# Patient Record
Sex: FEMALE | Race: WHITE | NOT HISPANIC OR LATINO | Employment: FULL TIME | URBAN - METROPOLITAN AREA
[De-identification: names, ages, dates, MRNs, and addresses within clinical notes are randomized per-mention and may not be internally consistent; named-entity substitution may affect disease eponyms.]

---

## 2017-01-04 ENCOUNTER — OFFICE VISIT (OUTPATIENT)
Dept: RETAIL CLINIC | Facility: CLINIC | Age: 57
End: 2017-01-04

## 2017-01-04 VITALS
TEMPERATURE: 97.7 F | HEART RATE: 74 BPM | OXYGEN SATURATION: 97 % | SYSTOLIC BLOOD PRESSURE: 108 MMHG | DIASTOLIC BLOOD PRESSURE: 74 MMHG | RESPIRATION RATE: 18 BRPM

## 2017-01-04 DIAGNOSIS — R31.9 URINARY TRACT INFECTION WITH HEMATURIA, SITE UNSPECIFIED: Primary | ICD-10-CM

## 2017-01-04 DIAGNOSIS — N39.0 URINARY TRACT INFECTION WITH HEMATURIA, SITE UNSPECIFIED: Primary | ICD-10-CM

## 2017-01-04 LAB
BILIRUB BLD-MCNC: NEGATIVE MG/DL
CLARITY, POC: CLEAR
COLOR UR: ABNORMAL
GLUCOSE UR STRIP-MCNC: NEGATIVE MG/DL
KETONES UR QL: NEGATIVE
LEUKOCYTE EST, POC: ABNORMAL
NITRITE UR-MCNC: POSITIVE MG/ML
PH UR: 7 [PH] (ref 5–8)
PROT UR STRIP-MCNC: ABNORMAL MG/DL
RBC # UR STRIP: ABNORMAL /UL
SP GR UR: 1.02 (ref 1–1.03)
UROBILINOGEN UR QL: NORMAL

## 2017-01-04 PROCEDURE — 99213 OFFICE O/P EST LOW 20 MIN: CPT | Performed by: NURSE PRACTITIONER

## 2017-01-04 PROCEDURE — 81003 URINALYSIS AUTO W/O SCOPE: CPT | Performed by: NURSE PRACTITIONER

## 2017-01-04 RX ORDER — NITROFURANTOIN 25; 75 MG/1; MG/1
100 CAPSULE ORAL 2 TIMES DAILY
Qty: 14 CAPSULE | Refills: 0 | Status: SHIPPED | OUTPATIENT
Start: 2017-01-04 | End: 2017-01-11

## 2017-01-04 NOTE — PROGRESS NOTES
Subjective:     Lena Kirkpatrick is a 56 y.o.     Urinary Tract Infection    This is a new problem. The current episode started today. Pain severity now: mild burning. There has been no fever. Associated symptoms include flank pain and hematuria. Pertinent negatives include no frequency, hesitancy, nausea, urgency or vomiting. Her past medical history is significant for recurrent UTIs. There is no history of kidney stones.         The following portions of the patient's history were reviewed and updated as appropriate: allergies, current medications, past family history, past medical history, past social history, past surgical history and problem list.      Review of Systems   Constitutional: Negative for appetite change and fever.   Respiratory: Negative for shortness of breath, wheezing and stridor.    Cardiovascular: Chest pain: hypertension.   Gastrointestinal: Negative for diarrhea, nausea and vomiting.   Genitourinary: Positive for flank pain and hematuria. Negative for difficulty urinating, frequency, hesitancy, urgency, vaginal bleeding and vaginal discharge.   Skin: Negative for color change, pallor and rash.   Neurological: Negative for dizziness, light-headedness and headaches.         Objective:      Physical Exam   Constitutional: She is oriented to person, place, and time. She appears well-developed and well-nourished. She is cooperative.   HENT:   Head: Normocephalic and atraumatic.   Right Ear: External ear and ear canal normal.   Left Ear: External ear and ear canal normal.   Nose: Nose normal.   Eyes: Conjunctivae and lids are normal.   Cardiovascular: Normal rate, regular rhythm, S1 normal, S2 normal and normal heart sounds.    Pulmonary/Chest: Effort normal and breath sounds normal.   Abdominal: Soft. Normal appearance and bowel sounds are normal. She exhibits no mass. There is no tenderness. There is no rigidity, no guarding and no CVA tenderness.   Lymphadenopathy:     She has no cervical  adenopathy.   Neurological: She is alert and oriented to person, place, and time.   Skin: Skin is warm, dry and intact.   Psychiatric: She has a normal mood and affect. Her speech is normal.   Vitals reviewed.          Lena was seen today for urinary tract infection.    Diagnoses and all orders for this visit:    Urinary tract infection with hematuria, site unspecified  -     Urine Culture  -     POC Urinalysis Dipstick, Automated    Other orders  -     nitrofurantoin, macrocrystal-monohydrate, (MACROBID) 100 MG capsule; Take 1 capsule by mouth 2 (Two) Times a Day for 7 days.

## 2017-01-04 NOTE — MR AVS SNAPSHOT
Lena Kirkpatrick   1/4/2017 8:45 AM   Office Visit    Dept Phone:  [phone]   Encounter #:  [id number]    Provider:  PROVIDER JESI LEWIS   Department:  [facility]                Your Full Care Plan              Today's Medication Changes          These changes are accurate as of: 1/4/17 10:00 AM.  If you have any questions, ask your nurse or doctor.               New Medication(s)Ordered:     nitrofurantoin (macrocrystal-monohydrate) 100 MG capsule   Commonly known as:  MACROBID   Take 1 capsule by mouth 2 (Two) Times a Day for 7 days.            Where to Get Your Medications      These medications were sent to [address] [address] [facility] - [phone] [facility] [phone]   [address] [address] [address]     Phone:  [phone]     nitrofurantoin (macrocrystal-monohydrate) 100 MG capsule                  Your Updated Medication List          This list is accurate as of: 1/4/17 10:00 AM.  Always use your most recent med list.                escitalopram 20 MG tablet   Commonly known as:  LEXAPRO       FLUARIX QUADRIVALENT 0.5 ML suspension prefilled syringe injection   Generic drug:  influenza vac split quad       lisinopril 20 MG tablet   Commonly known as:  PRINIVIL,ZESTRIL       nitrofurantoin (macrocrystal-monohydrate) 100 MG capsule   Commonly known as:  MACROBID   Take 1 capsule by mouth 2 (Two) Times a Day for 7 days.       PNEUMOVAX 23 25 MCG/0.5ML vaccine   Generic drug:  pneumococcal polysaccharide 23-valent       PREMARIN 0.625 MG/GM vaginal cream   Generic drug:  conjugated estrogens       PROVENTIL  (90 BASE) MCG/ACT inhaler   Generic drug:  albuterol               Instructions    Urinary Tract Infection  Urinary tract infections (UTIs) can develop anywhere along your urinary tract. Your urinary tract is your body's drainage system for removing wastes and extra water. Your urinary tract  includes two kidneys, two ureters, a bladder, and a urethra. Your kidneys are a pair of bean-shaped organs. Each kidney is about the size of your fist. They are located below your ribs, one on each side of your spine.  CAUSES  Infections are caused by microbes, which are microscopic organisms, including fungi, viruses, and bacteria. These organisms are so small that they can only be seen through a microscope. Bacteria are the microbes that most commonly cause UTIs.  SYMPTOMS   Symptoms of UTIs may vary by age and gender of the patient and by the location of the infection. Symptoms in young women typically include a frequent and intense urge to urinate and a painful, burning feeling in the bladder or urethra during urination. Older women and men are more likely to be tired, shaky, and weak and have muscle aches and abdominal pain. A fever may mean the infection is in your kidneys. Other symptoms of a kidney infection include pain in your back or sides below the ribs, nausea, and vomiting.  DIAGNOSIS  To diagnose a UTI, your caregiver will ask you about your symptoms. Your caregiver will also ask you to provide a urine sample. The urine sample will be tested for bacteria and white blood cells. White blood cells are made by your body to help fight infection.  TREATMENT   Typically, UTIs can be treated with medication. Because most UTIs are caused by a bacterial infection, they usually can be treated with the use of antibiotics. The choice of antibiotic and length of treatment depend on your symptoms and the type of bacteria causing your infection.  HOME CARE INSTRUCTIONS  · If you were prescribed antibiotics, take them exactly as your caregiver instructs you. Finish the medication even if you feel better after you have only taken some of the medication.  · Drink enough water and fluids to keep your urine clear or pale yellow.  · Avoid caffeine, tea, and carbonated beverages. They tend to irritate your bladder.  · Empty  your bladder often. Avoid holding urine for long periods of time.  · Empty your bladder before and after sexual intercourse.  · After a bowel movement, women should cleanse from front to back. Use each tissue only once.  SEEK MEDICAL CARE IF:   · You have back pain.  · You develop a fever.  · Your symptoms do not begin to resolve within 3 days.  SEEK IMMEDIATE MEDICAL CARE IF:   · You have severe back pain or lower abdominal pain.  · You develop chills.  · You have nausea or vomiting.  · You have continued burning or discomfort with urination.  MAKE SURE YOU:   · Understand these instructions.  · Will watch your condition.  · Will get help right away if you are not doing well or get worse.     This information is not intended to replace advice given to you by your health care provider. Make sure you discuss any questions you have with your health care provider.     Document Released: 2006 Document Revised: 2016 Document Reviewed: 2013  [employer] Interactive Patient Education © [employer]       Patient Instructions History      Upcoming Appointments     Visit Type Date Time Department    OFFICE VISIT 2017  8:45 AM REY JEFFERSON      [employer] Signup     [facility] allows you to send messages to your doctor, view your test results, renew your prescriptions, schedule appointments, and more. To sign up, go to [employer] and click on the Sign Up Now link in the New User? box. Enter your [employer] Activation Code exactly as it appears below along with the last four digits of your Social Security Number and your Date of Birth () to complete the sign-up process. If you do not sign up before the expiration date, you must request a new code.    [employer] Activation Code: [email or web address]  Expires: 2017 10:00 AM    If you have questions, you can email [email or web address] or call [phone] to talk to our [employer] staff. Remember, [employer] is NOT to be  used for urgent needs. For medical emergencies, dial 911.               Other Info from Your Visit           Allergies     No Known Allergies      Reason for Visit     Urinary Tract Infection           Vital Signs     Blood Pressure Pulse Temperature Respirations Oxygen Saturation Smoking Status    108/74 74 97.7 °F (36.5 °C) 18 97% Current Every Day Smoker

## 2017-01-04 NOTE — PATIENT INSTRUCTIONS
Urinary Tract Infection  Urinary tract infections (UTIs) can develop anywhere along your urinary tract. Your urinary tract is your body's drainage system for removing wastes and extra water. Your urinary tract includes two kidneys, two ureters, a bladder, and a urethra. Your kidneys are a pair of bean-shaped organs. Each kidney is about the size of your fist. They are located below your ribs, one on each side of your spine.  CAUSES  Infections are caused by microbes, which are microscopic organisms, including fungi, viruses, and bacteria. These organisms are so small that they can only be seen through a microscope. Bacteria are the microbes that most commonly cause UTIs.  SYMPTOMS   Symptoms of UTIs may vary by age and gender of the patient and by the location of the infection. Symptoms in young women typically include a frequent and intense urge to urinate and a painful, burning feeling in the bladder or urethra during urination. Older women and men are more likely to be tired, shaky, and weak and have muscle aches and abdominal pain. A fever may mean the infection is in your kidneys. Other symptoms of a kidney infection include pain in your back or sides below the ribs, nausea, and vomiting.  DIAGNOSIS  To diagnose a UTI, your caregiver will ask you about your symptoms. Your caregiver will also ask you to provide a urine sample. The urine sample will be tested for bacteria and white blood cells. White blood cells are made by your body to help fight infection.  TREATMENT   Typically, UTIs can be treated with medication. Because most UTIs are caused by a bacterial infection, they usually can be treated with the use of antibiotics. The choice of antibiotic and length of treatment depend on your symptoms and the type of bacteria causing your infection.  HOME CARE INSTRUCTIONS  · If you were prescribed antibiotics, take them exactly as your caregiver instructs you. Finish the medication even if you feel better after  you have only taken some of the medication.  · Drink enough water and fluids to keep your urine clear or pale yellow.  · Avoid caffeine, tea, and carbonated beverages. They tend to irritate your bladder.  · Empty your bladder often. Avoid holding urine for long periods of time.  · Empty your bladder before and after sexual intercourse.  · After a bowel movement, women should cleanse from front to back. Use each tissue only once.  SEEK MEDICAL CARE IF:   · You have back pain.  · You develop a fever.  · Your symptoms do not begin to resolve within 3 days.  SEEK IMMEDIATE MEDICAL CARE IF:   · You have severe back pain or lower abdominal pain.  · You develop chills.  · You have nausea or vomiting.  · You have continued burning or discomfort with urination.  MAKE SURE YOU:   · Understand these instructions.  · Will watch your condition.  · Will get help right away if you are not doing well or get worse.     This information is not intended to replace advice given to you by your health care provider. Make sure you discuss any questions you have with your health care provider.     Document Released: 09/27/2006 Document Revised: 09/07/2016 Document Reviewed: 01/25/2013  "[employer] Interactive Patient Education ©2016 "[employer] Inc.

## 2017-04-26 ENCOUNTER — TRANSCRIBE ORDERS (OUTPATIENT)
Dept: ADMINISTRATIVE | Facility: HOSPITAL | Age: 57
End: 2017-04-26

## 2017-04-26 DIAGNOSIS — Z12.31 VISIT FOR SCREENING MAMMOGRAM: Primary | ICD-10-CM

## 2017-05-18 ENCOUNTER — HOSPITAL ENCOUNTER (OUTPATIENT)
Dept: MAMMOGRAPHY | Facility: HOSPITAL | Age: 57
Discharge: HOME | End: 2017-05-18
Admitting: FAMILY MEDICINE

## 2017-05-18 DIAGNOSIS — Z12.31 VISIT FOR SCREENING MAMMOGRAM: ICD-10-CM

## 2017-05-18 PROCEDURE — 77063 BREAST TOMOSYNTHESIS BI: CPT

## 2017-05-18 PROCEDURE — G0202 SCR MAMMO BI INCL CAD: HCPCS

## 2017-09-06 ENCOUNTER — PREP FOR SURGERY (OUTPATIENT)
Dept: OTHER | Facility: HOSPITAL | Age: 57
End: 2017-09-06

## 2017-09-06 DIAGNOSIS — Z86.010 HISTORY OF COLON POLYPS: Primary | ICD-10-CM

## 2017-09-25 ENCOUNTER — TELEPHONE (OUTPATIENT)
Dept: FAMILY MEDICINE CLINIC | Facility: CLINIC | Age: 57
End: 2017-09-25

## 2017-09-25 ENCOUNTER — OFFICE VISIT (OUTPATIENT)
Dept: FAMILY MEDICINE CLINIC | Facility: CLINIC | Age: 57
End: 2017-09-25

## 2017-09-25 VITALS
TEMPERATURE: 98.6 F | WEIGHT: 218.6 LBS | BODY MASS INDEX: 37.52 KG/M2 | HEART RATE: 99 BPM | DIASTOLIC BLOOD PRESSURE: 66 MMHG | SYSTOLIC BLOOD PRESSURE: 128 MMHG | OXYGEN SATURATION: 98 %

## 2017-09-25 DIAGNOSIS — E66.01 MORBID OBESITY DUE TO EXCESS CALORIES (HCC): ICD-10-CM

## 2017-09-25 DIAGNOSIS — Z23 NEED FOR VACCINATION: ICD-10-CM

## 2017-09-25 DIAGNOSIS — Z72.0 TOBACCO ABUSE: ICD-10-CM

## 2017-09-25 DIAGNOSIS — F32.A ANXIETY AND DEPRESSION: ICD-10-CM

## 2017-09-25 DIAGNOSIS — F41.9 ANXIETY AND DEPRESSION: ICD-10-CM

## 2017-09-25 DIAGNOSIS — I10 ESSENTIAL HYPERTENSION: Primary | ICD-10-CM

## 2017-09-25 PROBLEM — B97.7 HPV IN FEMALE: Status: ACTIVE | Noted: 2017-09-25

## 2017-09-25 LAB
ALBUMIN SERPL-MCNC: 4.6 G/DL (ref 3.5–5.2)
ALBUMIN/GLOB SERPL: 1.8 G/DL
ALP SERPL-CCNC: 66 U/L (ref 39–117)
ALT SERPL W P-5'-P-CCNC: 12 U/L (ref 1–33)
ANION GAP SERPL CALCULATED.3IONS-SCNC: 12 MMOL/L
AST SERPL-CCNC: 18 U/L (ref 1–32)
BILIRUB SERPL-MCNC: 0.4 MG/DL (ref 0.1–1.2)
BUN BLD-MCNC: 12 MG/DL (ref 6–20)
BUN/CREAT SERPL: 11.5 (ref 7–25)
CALCIUM SPEC-SCNC: 9.7 MG/DL (ref 8.6–10.5)
CHLORIDE SERPL-SCNC: 102 MMOL/L (ref 98–107)
CHOLEST SERPL-MCNC: 186 MG/DL (ref 0–200)
CO2 SERPL-SCNC: 26 MMOL/L (ref 22–29)
CREAT BLD-MCNC: 1.04 MG/DL (ref 0.57–1)
ERYTHROCYTE [DISTWIDTH] IN BLOOD BY AUTOMATED COUNT: 12.4 % (ref 4.5–15)
GFR SERPL CREATININE-BSD FRML MDRD: 55 ML/MIN/1.73
GLOBULIN UR ELPH-MCNC: 2.6 GM/DL
GLUCOSE BLD-MCNC: 103 MG/DL (ref 65–99)
HCT VFR BLD AUTO: 43.5 % (ref 31–42)
HDLC SERPL-MCNC: 70 MG/DL (ref 40–60)
HGB BLD-MCNC: 13.7 G/DL (ref 12–18)
LDLC SERPL CALC-MCNC: 88 MG/DL (ref 0–100)
LDLC/HDLC SERPL: 1.25 {RATIO}
LYMPHOCYTES # BLD AUTO: 2.8 10*3/MM3 (ref 1.2–3.4)
LYMPHOCYTES NFR BLD AUTO: 26.4 % (ref 21–51)
MCH RBC QN AUTO: 28.3 PG (ref 26.1–33.1)
MCHC RBC AUTO-ENTMCNC: 31.5 G/DL (ref 33–37)
MCV RBC AUTO: 89.7 FL (ref 80–99)
MONOCYTES # BLD AUTO: 0.4 10*3/MM3 (ref 0.1–0.6)
MONOCYTES NFR BLD AUTO: 4.1 % (ref 2–9)
NEUTROPHILS # BLD AUTO: 7.4 10*3/MM3 (ref 1.4–6.5)
NEUTROPHILS NFR BLD AUTO: 69.5 % (ref 42–75)
PLATELET # BLD AUTO: 237 10*3/MM3 (ref 150–450)
PMV BLD AUTO: 8.7 FL (ref 7.1–10.5)
POTASSIUM BLD-SCNC: 4.6 MMOL/L (ref 3.5–5.2)
PROT SERPL-MCNC: 7.2 G/DL (ref 6–8.5)
RBC # BLD AUTO: 4.85 10*6/MM3 (ref 4–6)
SODIUM BLD-SCNC: 140 MMOL/L (ref 136–145)
TRIGL SERPL-MCNC: 142 MG/DL (ref 0–150)
TSH SERPL DL<=0.05 MIU/L-ACNC: 2.98 MIU/ML (ref 0.27–4.2)
VLDLC SERPL-MCNC: 28.4 MG/DL (ref 5–40)
WBC NRBC COR # BLD: 10.6 10*3/MM3 (ref 4.5–10)

## 2017-09-25 PROCEDURE — 90471 IMMUNIZATION ADMIN: CPT | Performed by: NURSE PRACTITIONER

## 2017-09-25 PROCEDURE — 80061 LIPID PANEL: CPT | Performed by: NURSE PRACTITIONER

## 2017-09-25 PROCEDURE — 36415 COLL VENOUS BLD VENIPUNCTURE: CPT | Performed by: NURSE PRACTITIONER

## 2017-09-25 PROCEDURE — 99213 OFFICE O/P EST LOW 20 MIN: CPT | Performed by: NURSE PRACTITIONER

## 2017-09-25 PROCEDURE — 90686 IIV4 VACC NO PRSV 0.5 ML IM: CPT | Performed by: NURSE PRACTITIONER

## 2017-09-25 PROCEDURE — 84443 ASSAY THYROID STIM HORMONE: CPT | Performed by: NURSE PRACTITIONER

## 2017-09-25 PROCEDURE — 80053 COMPREHEN METABOLIC PANEL: CPT | Performed by: NURSE PRACTITIONER

## 2017-09-25 PROCEDURE — 85025 COMPLETE CBC W/AUTO DIFF WBC: CPT | Performed by: NURSE PRACTITIONER

## 2017-09-25 RX ORDER — LISINOPRIL 10 MG/1
10 TABLET ORAL DAILY
Qty: 30 TABLET | Refills: 11 | Status: SHIPPED | OUTPATIENT
Start: 2017-09-25 | End: 2018-10-05 | Stop reason: SDUPTHER

## 2017-09-25 RX ORDER — SULFAMETHOXAZOLE AND TRIMETHOPRIM 800; 160 MG/1; MG/1
1 TABLET ORAL
COMMUNITY
Start: 2017-09-20 | End: 2017-09-27

## 2017-09-25 RX ORDER — BUPROPION HYDROCHLORIDE 150 MG/1
150 TABLET, EXTENDED RELEASE ORAL 2 TIMES DAILY
Qty: 60 TABLET | Refills: 2 | Status: SHIPPED | OUTPATIENT
Start: 2017-09-25 | End: 2018-10-05 | Stop reason: SDUPTHER

## 2017-09-25 RX ORDER — ESCITALOPRAM OXALATE 20 MG/1
20 TABLET ORAL DAILY
Qty: 90 TABLET | Refills: 1 | Status: CANCELLED | OUTPATIENT
Start: 2017-09-25

## 2017-09-25 RX ORDER — LISINOPRIL 20 MG/1
10 TABLET ORAL DAILY
Qty: 90 TABLET | Refills: 1 | Status: CANCELLED | OUTPATIENT
Start: 2017-09-25

## 2017-09-25 RX ORDER — ESCITALOPRAM OXALATE 20 MG/1
20 TABLET ORAL DAILY
Qty: 30 TABLET | Refills: 11 | Status: SHIPPED | OUTPATIENT
Start: 2017-09-25 | End: 2018-10-05 | Stop reason: SDUPTHER

## 2017-09-25 NOTE — PROGRESS NOTES
Subjective   Lena Kirkpatrick is a 56 y.o. female.     History of Present Illness   Here toFU on HTN on lisinopril 20 mg daily no CP dizziness HA LE edema, no regular checks BP at home  With depression and anxiety has been on lexapro 20 mg daily since 2011 and wondering if not as effective, prev tried paxil approx 1990s and wondering if would help, still smoking 1 ppd but doesn't finish entire cigarette and would like to quit smoking, prev tried chantix and didn't like the way it made her feel  With weight loss 10 lbs since last OV working on healthy diet and joined gym with regular exercise  Recently had HPV lesion removed by Dr Quincy anderson, pending FU with GYN 11/17 UTD mammo, pending colonoscopy 10/17  With 2 episodes of UTI 07/17 and current, went to [facility] still taking bactrim with sx improved    The following portions of the patient's history were reviewed and updated as appropriate: allergies, current medications, past family history, past medical history, past social history, past surgical history and problem list.    Review of Systems   Constitutional: Negative for fever.   Respiratory: Negative for cough, shortness of breath and wheezing.    Cardiovascular: Negative for chest pain, palpitations and leg swelling.   Endocrine: Negative for cold intolerance, heat intolerance, polydipsia, polyphagia and polyuria.   Genitourinary: Positive for dysuria.   Neurological: Negative for dizziness and headaches.   Psychiatric/Behavioral: Positive for decreased concentration. Negative for agitation, behavioral problems, confusion, dysphoric mood, hallucinations, self-injury, sleep disturbance and suicidal ideas. The patient is nervous/anxious. The patient is not hyperactive.    All other systems reviewed and are negative.      Objective   Physical Exam   Constitutional: She is oriented to person, place, and time. She appears well-developed and well-nourished.   HENT:   Head: Normocephalic and atraumatic.   Eyes:  Conjunctivae and EOM are normal. Pupils are equal, round, and reactive to light.   Cardiovascular: Normal rate, regular rhythm and normal heart sounds.    Pulmonary/Chest: Effort normal and breath sounds normal.   Musculoskeletal: Normal range of motion.   Neurological: She is alert and oriented to person, place, and time.   Skin: Skin is warm and dry.   Psychiatric: She has a normal mood and affect. Her behavior is normal. Judgment and thought content normal.   Vitals reviewed.      Assessment/Plan   Lena was seen today for med refill and annual exam.    Diagnoses and all orders for this visit:    Essential hypertension  -     CBC & Differential  -     Comprehensive Metabolic Panel  -     Lipid Panel  -     TSH    Anxiety and depression  -     CBC & Differential  -     Comprehensive Metabolic Panel  -     TSH    Morbid obesity due to excess calories    Tobacco abuse    Other orders  -     Cancel: escitalopram (LEXAPRO) 20 MG tablet; Take 1 tablet by mouth Daily.  -     Cancel: lisinopril (PRINIVIL,ZESTRIL) 20 MG tablet; Take 0.5 tablets by mouth Daily. Taking one daily  -     lisinopril (PRINIVIL,ZESTRIL) 10 MG tablet; Take 1 tablet by mouth Daily. One PO daily for BP  -     buPROPion SR (WELLBUTRIN SR) 150 MG 12 hr tablet; Take 1 tablet by mouth 2 (Two) Times a Day. 1 PO AM x 3 days then BID  -     escitalopram (LEXAPRO) 20 MG tablet; Take 1 tablet by mouth Daily.    check labs and call with results, refill lisinopril 10 mg daily and check BP at home, refill lexapro 20 mg and monitor, add wellbutrin  mg x 3 days then BID enc smoking cessation, Enc healthy diet and regular exercise for wt loss

## 2017-09-25 NOTE — TELEPHONE ENCOUNTER
Pt informed    ----- Message from EMMETT Cornejo sent at 9/25/2017  1:10 PM EDT -----  Thyroid and liver normal. BS slightly elevated 103 we will cont to monitor, cont healthy diet and regular exercise for wt loss and BS control. Your kidney function is slightly decreased suspect d/t fasting this AM, please increase H20 6-8 glasses H20. Chol normal

## 2017-09-25 NOTE — PATIENT INSTRUCTIONS
check labs and call with results, refill lisinopril 10 mg daily and check BP at home, refill lexapro 20 mg and monitor, add wellbutrin  mg x 3 days then BID enc smoking cessation, Enc healthy diet and regular exercise for wt loss

## 2017-10-19 ENCOUNTER — ANESTHESIA EVENT (OUTPATIENT)
Dept: GASTROENTEROLOGY | Facility: HOSPITAL | Age: 57
End: 2017-10-19

## 2017-10-19 ENCOUNTER — ANESTHESIA (OUTPATIENT)
Dept: GASTROENTEROLOGY | Facility: HOSPITAL | Age: 57
End: 2017-10-19

## 2017-10-19 ENCOUNTER — HOSPITAL ENCOUNTER (OUTPATIENT)
Facility: HOSPITAL | Age: 57
Setting detail: HOSPITAL OUTPATIENT SURGERY
Discharge: HOME | End: 2017-10-19
Attending: COLON & RECTAL SURGERY | Admitting: COLON & RECTAL SURGERY

## 2017-10-19 VITALS
DIASTOLIC BLOOD PRESSURE: 77 MMHG | BODY MASS INDEX: 36.46 KG/M2 | TEMPERATURE: 98.5 F | OXYGEN SATURATION: 98 % | SYSTOLIC BLOOD PRESSURE: 136 MMHG | RESPIRATION RATE: 18 BRPM | HEART RATE: 79 BPM | WEIGHT: 213.56 LBS | HEIGHT: 64 IN

## 2017-10-19 DIAGNOSIS — Z86.010 HISTORY OF COLON POLYPS: ICD-10-CM

## 2017-10-19 PROCEDURE — 25010000002 PROPOFOL 10 MG/ML EMULSION: Performed by: ANESTHESIOLOGY

## 2017-10-19 PROCEDURE — 45385 COLONOSCOPY W/LESION REMOVAL: CPT | Performed by: COLON & RECTAL SURGERY

## 2017-10-19 PROCEDURE — 88305 TISSUE EXAM BY PATHOLOGIST: CPT | Performed by: COLON & RECTAL SURGERY

## 2017-10-19 RX ORDER — SODIUM CHLORIDE, SODIUM LACTATE, POTASSIUM CHLORIDE, CALCIUM CHLORIDE 600; 310; 30; 20 MG/100ML; MG/100ML; MG/100ML; MG/100ML
30 INJECTION, SOLUTION INTRAVENOUS CONTINUOUS PRN
Status: DISCONTINUED | OUTPATIENT
Start: 2017-10-19 | End: 2017-10-19 | Stop reason: HOSPADM

## 2017-10-19 RX ORDER — LIDOCAINE HYDROCHLORIDE 20 MG/ML
INJECTION, SOLUTION INFILTRATION; PERINEURAL AS NEEDED
Status: DISCONTINUED | OUTPATIENT
Start: 2017-10-19 | End: 2017-10-19 | Stop reason: SURG

## 2017-10-19 RX ORDER — PROPOFOL 10 MG/ML
VIAL (ML) INTRAVENOUS CONTINUOUS PRN
Status: DISCONTINUED | OUTPATIENT
Start: 2017-10-19 | End: 2017-10-19 | Stop reason: SURG

## 2017-10-19 RX ORDER — PROPOFOL 10 MG/ML
VIAL (ML) INTRAVENOUS AS NEEDED
Status: DISCONTINUED | OUTPATIENT
Start: 2017-10-19 | End: 2017-10-19 | Stop reason: SURG

## 2017-10-19 RX ADMIN — SODIUM CHLORIDE, POTASSIUM CHLORIDE, SODIUM LACTATE AND CALCIUM CHLORIDE 30 ML/HR: 600; 310; 30; 20 INJECTION, SOLUTION INTRAVENOUS at 14:16

## 2017-10-19 RX ADMIN — PROPOFOL 150 MG: 10 INJECTION, EMULSION INTRAVENOUS at 15:28

## 2017-10-19 RX ADMIN — PROPOFOL 300 MCG/KG/MIN: 10 INJECTION, EMULSION INTRAVENOUS at 15:28

## 2017-10-19 RX ADMIN — LIDOCAINE HYDROCHLORIDE 60 MG: 20 INJECTION, SOLUTION INFILTRATION; PERINEURAL at 15:28

## 2017-10-19 RX ADMIN — SODIUM CHLORIDE, POTASSIUM CHLORIDE, SODIUM LACTATE AND CALCIUM CHLORIDE: 600; 310; 30; 20 INJECTION, SOLUTION INTRAVENOUS at 15:24

## 2017-10-19 NOTE — PLAN OF CARE
Problem: Patient Care Overview (Adult)  Goal: Plan of Care Review  Outcome: Ongoing (interventions implemented as appropriate)    10/19/17 1405   Coping/Psychosocial Response Interventions   Plan Of Care Reviewed With patient   Patient Care Overview   Progress no change       Goal: Adult Individualization and Mutuality  Outcome: Ongoing (interventions implemented as appropriate)  Goal: Discharge Needs Assessment  Outcome: Ongoing (interventions implemented as appropriate)    10/19/17 1405   Discharge Needs Assessment   Concerns To Be Addressed no discharge needs identified   Discharge Disposition home or self-care   Living Environment   Transportation Available car;family or friend will provide         Problem: GI Endoscopy (Adult)  Goal: Signs and Symptoms of Listed Potential Problems Will be Absent or Manageable (GI Endoscopy)  Outcome: Ongoing (interventions implemented as appropriate)    10/19/17 1405   GI Endoscopy   Problems Assessed (GI Endoscopy) all   Problems Present (GI Endoscopy) none

## 2017-10-19 NOTE — ANESTHESIA PREPROCEDURE EVALUATION
Anesthesia Evaluation     Patient summary reviewed and Nursing notes reviewed          Airway   Mallampati: II  TM distance: <3 FB  Neck ROM: full  possible difficult intubation  Dental - normal exam     Pulmonary - normal exam   (+) a smoker Current Abstained day of surgery,   Cardiovascular - normal exam    (+) hypertension,       Neuro/Psych  (+) psychiatric history Anxiety and Depression,    GI/Hepatic/Renal/Endo    (+) obesity, morbid obesity,     Musculoskeletal (-) negative ROS    Abdominal  - normal exam  (+) obese,    Substance History - negative use     OB/GYN negative ob/gyn ROS         Other                                      Anesthesia Plan    ASA 3     MAC     intravenous induction   Anesthetic plan and risks discussed with patient.

## 2017-10-19 NOTE — ANESTHESIA POSTPROCEDURE EVALUATION
Patient: Lena Kirkpatrick    Procedure Summary     Date Anesthesia Start Anesthesia Stop Room / Location    10/19/17 1524 1546  [facility]       Procedure Diagnosis Surgeon Provider    COLONOSCOPY TO CECUM WITH POLYPECTOMY ( HOT SNARE (N/A ) History of colon polyps  (History of colon polyps [Z86.010]) MD Ger Lindquist MD          Anesthesia Type: MAC  Last vitals  BP   136/77 (10/19/17 1604)   Temp   36.9 °C (98.5 °F) (10/19/17 1544)   Pulse   79 (10/19/17 1604)   Resp   18 (10/19/17 1604)     SpO2   98 % (10/19/17 1604)     Post Anesthesia Care and Evaluation    Patient location during evaluation: PHASE II  Patient participation: complete - patient participated  Level of consciousness: awake and alert  Pain management: adequate  Airway patency: patent  Anesthetic complications: No anesthetic complications  PONV Status: none  Cardiovascular status: acceptable  Respiratory status: acceptable  Hydration status: acceptable

## 2017-10-19 NOTE — H&P
Lena Kirkpatrick is a 56 y.o. female  who is referred by Rosi Cohen MD for a colonoscopy. She is an  has a history of previous adenomatous polyp.     She denies any change in bowel function, melena, or hematochezia.    Past Medical History:   Diagnosis Date   • Anxiety    • Depression    • Hypertension        Past Surgical History:   Procedure Laterality Date   • HYSTERECTOMY      Partial       Prescriptions Prior to Admission   Medication Sig Dispense Refill Last Dose   • buPROPion SR (WELLBUTRIN SR) 150 MG 12 hr tablet Take 1 tablet by mouth 2 (Two) Times a Day. 1 PO AM x 3 days then BID 60 tablet 2 10/19/2017 at 0530   • escitalopram (LEXAPRO) 20 MG tablet Take 1 tablet by mouth Daily. 30 tablet 11 10/19/2017 at 0530   • lisinopril (PRINIVIL,ZESTRIL) 10 MG tablet Take 1 tablet by mouth Daily. One PO daily for BP 30 tablet 11 10/19/2017 at 0530       No Known Allergies    Family History   Problem Relation Age of Onset   • Hypertension Mother    • Thyroid disease Mother    • Depression Mother    • Anxiety disorder Mother    • Heart disease Father    • Hypertension Father    • Diabetes Father    • Kidney disease Father    • Leukemia Sister        Social History     Social History   • Marital status: Single     Spouse name: N/A   • Number of children: N/A   • Years of education: N/A     Occupational History   • Not on file.     Social History Main Topics   • Smoking status: Current Every Day Smoker     Packs/day: 1.00   • Smokeless tobacco: Never Used   • Alcohol use No   • Drug use: Defer   • Sexual activity: Defer     Other Topics Concern   • Not on file     Social History Narrative       Review of Systems   Gastrointestinal: Negative for abdominal pain, nausea and vomiting.   All other systems reviewed and are negative.      Vitals:    10/19/17 1409   BP: 134/77   Pulse: 61   Resp: 20   Temp: 97.5 °F (36.4 °C)   SpO2: 97%         Physical Exam   Constitutional: She is oriented to person, place, and time. She  appears well-developed and well-nourished.   HENT:   Head: Normocephalic and atraumatic.   Eyes: EOM are normal. Pupils are equal, round, and reactive to light.   Cardiovascular: Regular rhythm.    Pulmonary/Chest: Effort normal.   Abdominal: Soft. She exhibits no distension.   Musculoskeletal: Normal range of motion.   Neurological: She is alert and oriented to person, place, and time.   Skin: Skin is warm and dry.   Psychiatric: Judgment and thought content normal.         Assessment/Plan      previous adenomatous polyp.         I recommend colonoscopy.  I described risks, benefits of the procedure with the patient including but not limited to bleeding, infection, possibility of perforation and possible polypectomy. All of the patient's questions were answered and they would like to proceed with the above recommendations.

## 2017-10-20 LAB
CYTO UR: NORMAL
LAB AP CASE REPORT: NORMAL
Lab: NORMAL
PATH REPORT.FINAL DX SPEC: NORMAL
PATH REPORT.GROSS SPEC: NORMAL

## 2017-10-31 ENCOUNTER — TELEPHONE (OUTPATIENT)
Dept: SURGERY | Facility: CLINIC | Age: 57
End: 2017-10-31

## 2017-10-31 NOTE — TELEPHONE ENCOUNTER
Patient requesting results of colonoscopy.  Per Angelica Tello she is working on resulting all of them.

## 2018-01-16 ENCOUNTER — TELEPHONE (OUTPATIENT)
Dept: FAMILY MEDICINE CLINIC | Facility: CLINIC | Age: 58
End: 2018-01-16

## 2018-05-14 ENCOUNTER — TRANSCRIBE ORDERS (OUTPATIENT)
Dept: ADMINISTRATIVE | Facility: HOSPITAL | Age: 58
End: 2018-05-14

## 2018-05-14 DIAGNOSIS — Z12.31 VISIT FOR SCREENING MAMMOGRAM: Primary | ICD-10-CM

## 2018-05-30 ENCOUNTER — HOSPITAL ENCOUNTER (OUTPATIENT)
Dept: MAMMOGRAPHY | Facility: HOSPITAL | Age: 58
Discharge: HOME | End: 2018-05-30
Admitting: FAMILY MEDICINE

## 2018-05-30 DIAGNOSIS — Z12.31 VISIT FOR SCREENING MAMMOGRAM: ICD-10-CM

## 2018-05-30 PROCEDURE — 77063 BREAST TOMOSYNTHESIS BI: CPT

## 2018-05-30 PROCEDURE — 77067 SCR MAMMO BI INCL CAD: CPT

## 2018-10-05 ENCOUNTER — OFFICE VISIT (OUTPATIENT)
Dept: FAMILY MEDICINE CLINIC | Facility: CLINIC | Age: 58
End: 2018-10-05

## 2018-10-05 VITALS
WEIGHT: 229.8 LBS | SYSTOLIC BLOOD PRESSURE: 120 MMHG | BODY MASS INDEX: 39.23 KG/M2 | HEIGHT: 64 IN | DIASTOLIC BLOOD PRESSURE: 78 MMHG | TEMPERATURE: 98.3 F | HEART RATE: 100 BPM | OXYGEN SATURATION: 95 %

## 2018-10-05 DIAGNOSIS — E66.01 MORBID OBESITY DUE TO EXCESS CALORIES (HCC): ICD-10-CM

## 2018-10-05 DIAGNOSIS — F41.9 ANXIETY AND DEPRESSION: ICD-10-CM

## 2018-10-05 DIAGNOSIS — Z72.0 TOBACCO ABUSE: ICD-10-CM

## 2018-10-05 DIAGNOSIS — I10 ESSENTIAL HYPERTENSION: Primary | ICD-10-CM

## 2018-10-05 DIAGNOSIS — Z23 NEED FOR INFLUENZA VACCINATION: ICD-10-CM

## 2018-10-05 DIAGNOSIS — F32.A ANXIETY AND DEPRESSION: ICD-10-CM

## 2018-10-05 PROCEDURE — 99214 OFFICE O/P EST MOD 30 MIN: CPT | Performed by: NURSE PRACTITIONER

## 2018-10-05 PROCEDURE — 90471 IMMUNIZATION ADMIN: CPT | Performed by: NURSE PRACTITIONER

## 2018-10-05 PROCEDURE — 90674 CCIIV4 VAC NO PRSV 0.5 ML IM: CPT | Performed by: NURSE PRACTITIONER

## 2018-10-05 RX ORDER — ALBUTEROL SULFATE 90 UG/1
2 AEROSOL, METERED RESPIRATORY (INHALATION)
COMMUNITY
Start: 2018-05-04 | End: 2019-12-04 | Stop reason: SDUPTHER

## 2018-10-05 RX ORDER — LISINOPRIL 10 MG/1
10 TABLET ORAL DAILY
Qty: 90 TABLET | Refills: 3 | Status: SHIPPED | OUTPATIENT
Start: 2018-10-05 | End: 2020-04-23

## 2018-10-05 RX ORDER — BUPROPION HYDROCHLORIDE 150 MG/1
150 TABLET, EXTENDED RELEASE ORAL EVERY 12 HOURS SCHEDULED
Qty: 180 TABLET | Refills: 3 | Status: SHIPPED | OUTPATIENT
Start: 2018-10-05 | End: 2019-12-04

## 2018-10-05 RX ORDER — LORATADINE 10 MG/1
10 TABLET ORAL DAILY
COMMUNITY
End: 2020-01-10

## 2018-10-05 RX ORDER — ESCITALOPRAM OXALATE 20 MG/1
20 TABLET ORAL DAILY
Qty: 90 TABLET | Refills: 3 | Status: SHIPPED | OUTPATIENT
Start: 2018-10-05 | End: 2019-12-04

## 2018-10-05 NOTE — PROGRESS NOTES
Subjective   Lena Kirkpatrick is a 57 y.o. female.     History of Present Illness   Here to FU on chronic HTN on lisinopril 10 mg daily no CP dizziness HA LE edema, no regular checks BP at home, With weight gain 15 lbs since last OV  With chronic depression and anxiety has been on lexapro 20 mg daily since 2011 and not helping as much as prev, added wellbutrin  mg BID last year and helped mood but stopped after 3 mo no refills, still smoking 1 ppd but doesn't finish entire cigarette and would like to quit smoking, prev tried chantix and didn't like the way it made her feel, last CXR 11/16 NAD, with chronic cough was [place] [facility] [place] and tx with some improvement doesn't use albuterol regularly, tried patches during illness but burned skin and stopped  UTD mammo 05/18, UTD colonoscopy and pap, last pna shot 08/16    The following portions of the patient's history were reviewed and updated as appropriate: allergies, current medications, past family history, past medical history, past social history, past surgical history and problem list.    Review of Systems   Constitutional: Positive for fatigue. Negative for fever.   Respiratory: Negative for cough, shortness of breath and wheezing.    Cardiovascular: Negative for chest pain, palpitations and leg swelling.   Allergic/Immunologic: Positive for environmental allergies.   Neurological: Negative for dizziness and headaches.   Psychiatric/Behavioral: Positive for dysphoric mood. Negative for agitation, behavioral problems, confusion, decreased concentration, hallucinations, self-injury, sleep disturbance and suicidal ideas. The patient is nervous/anxious. The patient is not hyperactive.    All other systems reviewed and are negative.      Objective   Physical Exam   Constitutional: She is oriented to person, place, and time. She appears well-developed and well-nourished.   HENT:   Head: Normocephalic and atraumatic.   Eyes: Pupils are equal, round,  and reactive to light. Conjunctivae and EOM are normal.   Cardiovascular: Normal rate, regular rhythm and normal heart sounds.    Pulmonary/Chest: Effort normal and breath sounds normal.   Musculoskeletal: Normal range of motion. She exhibits no edema (B LE).   Neurological: She is alert and oriented to person, place, and time.   Skin: Skin is warm and dry.   Psychiatric: She has a normal mood and affect. Her behavior is normal. Judgment and thought content normal.   Vitals reviewed.      Assessment/Plan   Lena was seen today for hypertension.    Diagnoses and all orders for this visit:    Essential hypertension  -     CBC & Differential  -     Comprehensive Metabolic Panel  -     Lipid Panel  -     TSH  -     Urinalysis With Microscopic - Urine, Clean Catch    Morbid obesity due to excess calories (CMS/HCC)    Anxiety and depression    Tobacco abuse    Need for influenza vaccination    Other orders  -     lisinopril (PRINIVIL,ZESTRIL) 10 MG tablet; Take 1 tablet by mouth Daily. One PO daily for BP  -     escitalopram (LEXAPRO) 20 MG tablet; Take 1 tablet by mouth Daily.  -     buPROPion SR (WELLBUTRIN SR) 150 MG 12 hr tablet; Take 1 tablet by mouth Every 12 (Twelve) Hours.  -     Flucelvax Quad=>4Years (PFS)    RTC fasting check labs and call with results, refill all chronic dz meds, check BP at home, Enc healthy diet and regular exercise for wt loss, restart wellbutrin  mg BID and enc smoking cessation, flu shot today

## 2018-10-05 NOTE — PATIENT INSTRUCTIONS
RTC fasting check labs and call with results, refill all chronic dz meds, check BP at home, Enc healthy diet and regular exercise for wt loss, restart wellbutrin  mg BID and enc smoking cessation, flu shot today

## 2018-10-12 LAB
ALBUMIN SERPL-MCNC: 4.3 G/DL (ref 3.5–5.2)
ALBUMIN/GLOB SERPL: 1.3 G/DL
ALP SERPL-CCNC: 73 U/L (ref 39–117)
ALT SERPL W P-5'-P-CCNC: 13 U/L (ref 1–33)
ANION GAP SERPL CALCULATED.3IONS-SCNC: 10.9 MMOL/L
AST SERPL-CCNC: 14 U/L (ref 1–32)
BACTERIA UR QL AUTO: NORMAL /HPF
BILIRUB SERPL-MCNC: 0.3 MG/DL (ref 0.1–1.2)
BILIRUB UR QL STRIP: NEGATIVE
BUN BLD-MCNC: 13 MG/DL (ref 6–20)
BUN/CREAT SERPL: 14.4 (ref 7–25)
CALCIUM SPEC-SCNC: 9.6 MG/DL (ref 8.6–10.5)
CHLORIDE SERPL-SCNC: 101 MMOL/L (ref 98–107)
CHOLEST SERPL-MCNC: 197 MG/DL (ref 0–200)
CLARITY UR: CLEAR
CO2 SERPL-SCNC: 28.1 MMOL/L (ref 22–29)
COLOR UR: YELLOW
CREAT BLD-MCNC: 0.9 MG/DL (ref 0.57–1)
ERYTHROCYTE [DISTWIDTH] IN BLOOD BY AUTOMATED COUNT: 13.4 % (ref 4.5–15)
GFR SERPL CREATININE-BSD FRML MDRD: 65 ML/MIN/1.73
GLOBULIN UR ELPH-MCNC: 3.2 GM/DL
GLUCOSE BLD-MCNC: 100 MG/DL (ref 65–99)
GLUCOSE UR STRIP-MCNC: NEGATIVE MG/DL
HCT VFR BLD AUTO: 42.4 % (ref 31–42)
HDLC SERPL-MCNC: 82 MG/DL (ref 40–60)
HGB BLD-MCNC: 13.5 G/DL (ref 12–18)
HGB UR QL STRIP.AUTO: NEGATIVE
KETONES UR QL STRIP: NEGATIVE
LDLC SERPL CALC-MCNC: 90 MG/DL (ref 0–100)
LDLC/HDLC SERPL: 1.1 {RATIO}
LEUKOCYTE ESTERASE UR QL STRIP.AUTO: NEGATIVE
LYMPHOCYTES # BLD AUTO: 4 10*3/MM3 (ref 1.2–3.4)
LYMPHOCYTES NFR BLD AUTO: 36.5 % (ref 21–51)
MCH RBC QN AUTO: 29.3 PG (ref 26.1–33.1)
MCHC RBC AUTO-ENTMCNC: 31.9 G/DL (ref 33–37)
MCV RBC AUTO: 92.1 FL (ref 80–99)
MONOCYTES # BLD AUTO: 0.6 10*3/MM3 (ref 0.1–0.6)
MONOCYTES NFR BLD AUTO: 5.1 % (ref 2–9)
NEUTROPHILS # BLD AUTO: 6.4 10*3/MM3 (ref 1.4–6.5)
NEUTROPHILS NFR BLD AUTO: 58.4 % (ref 42–75)
NITRITE UR QL STRIP: NEGATIVE
PH UR STRIP.AUTO: 7 [PH] (ref 4.6–8)
PLATELET # BLD AUTO: 261 10*3/MM3 (ref 150–450)
PMV BLD AUTO: 7.6 FL (ref 7.1–10.5)
POTASSIUM BLD-SCNC: 4.4 MMOL/L (ref 3.5–5.2)
PROT SERPL-MCNC: 7.5 G/DL (ref 6–8.5)
PROT UR QL STRIP: NEGATIVE
RBC # BLD AUTO: 4.61 10*6/MM3 (ref 4–6)
RBC # UR: NORMAL /HPF
REF LAB TEST METHOD: NORMAL
SODIUM BLD-SCNC: 140 MMOL/L (ref 136–145)
SP GR UR STRIP: 1.01 (ref 1–1.03)
SQUAMOUS #/AREA URNS HPF: NORMAL /HPF
TRIGL SERPL-MCNC: 126 MG/DL (ref 0–150)
TSH SERPL DL<=0.05 MIU/L-ACNC: 2.31 MIU/ML (ref 0.27–4.2)
UROBILINOGEN UR QL STRIP: NORMAL
VLDLC SERPL-MCNC: 25.2 MG/DL (ref 5–40)
WBC NRBC COR # BLD: 11 10*3/MM3 (ref 4.5–10)
WBC UR QL AUTO: NORMAL /HPF

## 2018-10-12 PROCEDURE — 80061 LIPID PANEL: CPT | Performed by: NURSE PRACTITIONER

## 2018-10-12 PROCEDURE — 81001 URINALYSIS AUTO W/SCOPE: CPT | Performed by: NURSE PRACTITIONER

## 2018-10-12 PROCEDURE — 84443 ASSAY THYROID STIM HORMONE: CPT | Performed by: NURSE PRACTITIONER

## 2018-10-12 PROCEDURE — 85025 COMPLETE CBC W/AUTO DIFF WBC: CPT | Performed by: NURSE PRACTITIONER

## 2018-10-12 PROCEDURE — 80053 COMPREHEN METABOLIC PANEL: CPT | Performed by: NURSE PRACTITIONER

## 2018-10-15 ENCOUNTER — TELEPHONE (OUTPATIENT)
Dept: FAMILY MEDICINE CLINIC | Facility: CLINIC | Age: 58
End: 2018-10-15

## 2018-10-15 NOTE — TELEPHONE ENCOUNTER
----- Message from EMMETT Cornejo sent at 10/15/2018  9:51 AM EDT -----  Thyroid, BS, kidney, liver, and chol normal, well controlled. Urine normal. WBC sl elevated we will monitor    Pt informed of lab results

## 2018-11-08 ENCOUNTER — OFFICE VISIT (OUTPATIENT)
Dept: FAMILY MEDICINE CLINIC | Facility: CLINIC | Age: 58
End: 2018-11-08

## 2018-11-08 ENCOUNTER — DOCUMENTATION (OUTPATIENT)
Dept: FAMILY MEDICINE CLINIC | Facility: CLINIC | Age: 58
End: 2018-11-08

## 2018-11-08 ENCOUNTER — HOSPITAL ENCOUNTER (OUTPATIENT)
Dept: CT IMAGING | Facility: HOSPITAL | Age: 58
Discharge: HOME | End: 2018-11-08
Admitting: NURSE PRACTITIONER

## 2018-11-08 VITALS
OXYGEN SATURATION: 98 % | TEMPERATURE: 98.2 F | SYSTOLIC BLOOD PRESSURE: 124 MMHG | RESPIRATION RATE: 16 BRPM | BODY MASS INDEX: 40.05 KG/M2 | HEIGHT: 64 IN | DIASTOLIC BLOOD PRESSURE: 80 MMHG | WEIGHT: 234.6 LBS | HEART RATE: 73 BPM

## 2018-11-08 DIAGNOSIS — R10.32 LLQ PAIN: ICD-10-CM

## 2018-11-08 DIAGNOSIS — R10.9 LEFT FLANK PAIN: Primary | ICD-10-CM

## 2018-11-08 DIAGNOSIS — R10.9 LEFT FLANK PAIN: ICD-10-CM

## 2018-11-08 LAB
BACTERIA UR QL AUTO: NORMAL /HPF
BILIRUB UR QL STRIP: NEGATIVE
CLARITY UR: CLEAR
COLOR UR: YELLOW
CREAT BLDA-MCNC: 1 MG/DL (ref 0.6–1.3)
ERYTHROCYTE [DISTWIDTH] IN BLOOD BY AUTOMATED COUNT: 12.9 % (ref 4.5–15)
GLUCOSE UR STRIP-MCNC: NEGATIVE MG/DL
HCT VFR BLD AUTO: 39.8 % (ref 31–42)
HGB BLD-MCNC: 13.2 G/DL (ref 12–18)
HGB UR QL STRIP.AUTO: NEGATIVE
KETONES UR QL STRIP: NEGATIVE
LEUKOCYTE ESTERASE UR QL STRIP.AUTO: NEGATIVE
LYMPHOCYTES # BLD AUTO: 3.8 10*3/MM3 (ref 1.2–3.4)
LYMPHOCYTES NFR BLD AUTO: 34.7 % (ref 21–51)
MCH RBC QN AUTO: 30.2 PG (ref 26.1–33.1)
MCHC RBC AUTO-ENTMCNC: 33.1 G/DL (ref 33–37)
MCV RBC AUTO: 91.1 FL (ref 80–99)
MONOCYTES # BLD AUTO: 0.6 10*3/MM3 (ref 0.1–0.6)
MONOCYTES NFR BLD AUTO: 5.6 % (ref 2–9)
NEUTROPHILS # BLD AUTO: 6.6 10*3/MM3 (ref 1.4–6.5)
NEUTROPHILS NFR BLD AUTO: 59.7 % (ref 42–75)
NITRITE UR QL STRIP: NEGATIVE
PH UR STRIP.AUTO: 5.5 [PH] (ref 4.6–8)
PLATELET # BLD AUTO: 257 10*3/MM3 (ref 150–450)
PMV BLD AUTO: 8.1 FL (ref 7.1–10.5)
PROT UR QL STRIP: NEGATIVE
RBC # BLD AUTO: 4.37 10*6/MM3 (ref 4–6)
RBC # UR: NORMAL /HPF
REF LAB TEST METHOD: NORMAL
SP GR UR STRIP: 1.02 (ref 1–1.03)
SQUAMOUS #/AREA URNS HPF: NORMAL /HPF
UROBILINOGEN UR QL STRIP: NORMAL
WBC NRBC COR # BLD: 11 10*3/MM3 (ref 4.5–10)
WBC UR QL AUTO: NORMAL /HPF

## 2018-11-08 PROCEDURE — 25010000002 IOPAMIDOL 61 % SOLUTION: Performed by: NURSE PRACTITIONER

## 2018-11-08 PROCEDURE — 87086 URINE CULTURE/COLONY COUNT: CPT | Performed by: NURSE PRACTITIONER

## 2018-11-08 PROCEDURE — 82565 ASSAY OF CREATININE: CPT

## 2018-11-08 PROCEDURE — 85025 COMPLETE CBC W/AUTO DIFF WBC: CPT | Performed by: NURSE PRACTITIONER

## 2018-11-08 PROCEDURE — 99213 OFFICE O/P EST LOW 20 MIN: CPT | Performed by: NURSE PRACTITIONER

## 2018-11-08 PROCEDURE — 81001 URINALYSIS AUTO W/SCOPE: CPT | Performed by: NURSE PRACTITIONER

## 2018-11-08 PROCEDURE — 74177 CT ABD & PELVIS W/CONTRAST: CPT

## 2018-11-08 RX ORDER — METRONIDAZOLE 500 MG/1
500 TABLET ORAL 3 TIMES DAILY
Qty: 21 TABLET | Refills: 0 | Status: SHIPPED | OUTPATIENT
Start: 2018-11-08 | End: 2019-12-04

## 2018-11-08 RX ORDER — CIPROFLOXACIN 500 MG/1
500 TABLET, FILM COATED ORAL 2 TIMES DAILY
Qty: 14 TABLET | Refills: 0 | Status: SHIPPED | OUTPATIENT
Start: 2018-11-08 | End: 2019-12-04

## 2018-11-08 RX ADMIN — IOPAMIDOL 85 ML: 612 INJECTION, SOLUTION INTRAVENOUS at 16:01

## 2018-11-08 NOTE — PROGRESS NOTES
Subjective   Lena Kirkpatrick is a 57 y.o. female.     History of Present Illness   C/o urinary odor, left side and low back pain, lower abd pressure, started about 1 week ago, denies frequency. Denies dysuria, she has had chills, sweats, denies fever, she denies vag bleeding or d/c. She did not try anything at home. She denies diarrhea. She has left flank pain. She states left flank pain worse with movement. Denies any injury. Has had some nausea. No hx of diverticulosis last colonoscopy 10/17, no history of kidney stones.     The following portions of the patient's history were reviewed and updated as appropriate: allergies, current medications, past family history, past medical history, past social history, past surgical history and problem list.    Review of Systems   Constitutional: Negative for chills, diaphoresis and fever.   Respiratory: Negative for cough and shortness of breath.    Cardiovascular: Negative for chest pain.   Gastrointestinal: Positive for abdominal pain and nausea. Negative for diarrhea and vomiting.   Genitourinary: Positive for flank pain and pelvic pain. Negative for urinary incontinence, decreased urine volume, dysuria, frequency, hematuria, urgency, vaginal bleeding and vaginal discharge.   Musculoskeletal: Negative for arthralgias and myalgias.   Skin: Negative for skin lesions.   Neurological: Negative for dizziness, light-headedness and headache.   All other systems reviewed and are negative.      Objective   Physical Exam   Constitutional: She is oriented to person, place, and time. She appears well-developed and well-nourished.   HENT:   Head: Normocephalic.   Eyes: Pupils are equal, round, and reactive to light.   Cardiovascular: Normal rate, regular rhythm and normal heart sounds.    Pulmonary/Chest: Effort normal and breath sounds normal.   Abdominal: Soft. Normal appearance and bowel sounds are normal. There is no hepatosplenomegaly. There is tenderness in the suprapubic area,  left upper quadrant and left lower quadrant. There is no CVA tenderness.   Musculoskeletal: Normal range of motion.   Neurological: She is alert and oriented to person, place, and time.   Skin: Skin is warm and dry.   Psychiatric: She has a normal mood and affect. Her behavior is normal.   Nursing note and vitals reviewed.        Assessment/Plan   Lena was seen today for urinary tract infection.    Diagnoses and all orders for this visit:    Left flank pain  -     Urinalysis With Microscopic - Urine, Clean Catch  -     Urinalysis without microscopic (no culture) - Urine, Clean Catch  -     Urinalysis, Microscopic Only - Urine, Clean Catch  -     CBC & Differential  -     Urine Culture - Urine, Urine, Clean Catch  -     CBC Auto Differential  -     CT Abdomen Pelvis With Contrast; Future    LLQ pain  -     CT Abdomen Pelvis With Contrast; Future    UA today will send for culture and call with results.   Cbc today,   Drink plenty of H2O, wipe front to back after urination.   Avoid bladder irritants- coffee, caffeine, carbonation.  CT abd today will call with results.   If symptoms worsen advised to go to the Er   Increase fluid intake, get plenty of rest.   Patient agrees with plan of care and understands instructions. Call if worsening symptoms or any problems or concerns.     CT abd showed diverticulitis vs colitis, no hx of N/V/D. Patient informed, will begin cipro 500mg bid for 7 days, flagyl 500mg tid for 7 days. Call with problems, recheck in office 1 week, ER if worse.

## 2018-11-08 NOTE — NURSING NOTE
Phone call from EMMETT Bess stating she had viewed pt's CT A&P, spoke with pt and pt is to go home. S/I dc'd with cath tip intact.

## 2018-11-08 NOTE — PATIENT INSTRUCTIONS
UA today will send for culture and call with results.   Cbc today,   Drink plenty of H2O, wipe front to back after urination.   Avoid bladder irritants- coffee, caffeine, carbonation.  CT abd today will call with results.   If symptoms worsen advised to go to the Er   Increase fluid intake, get plenty of rest.   Patient agrees with plan of care and understands instructions. Call if worsening symptoms or any problems or concerns.

## 2018-11-10 LAB — BACTERIA SPEC AEROBE CULT: NO GROWTH

## 2018-11-12 ENCOUNTER — TELEPHONE (OUTPATIENT)
Dept: FAMILY MEDICINE CLINIC | Facility: CLINIC | Age: 58
End: 2018-11-12

## 2018-11-12 NOTE — TELEPHONE ENCOUNTER
----- Message from EMMETT Mauro sent at 11/12/2018  8:08 AM EST -----  Please call patient with results. Urine culture shows no growth, how are her symptoms?    Pt informed of lab results. Pt stated she is still a little sore in LLQ.

## 2019-07-30 ENCOUNTER — TRANSCRIBE ORDERS (OUTPATIENT)
Dept: ADMINISTRATIVE | Facility: HOSPITAL | Age: 59
End: 2019-07-30

## 2019-07-30 DIAGNOSIS — Z12.39 BREAST CANCER SCREENING: Primary | ICD-10-CM

## 2019-08-01 ENCOUNTER — TELEPHONE (OUTPATIENT)
Dept: FAMILY MEDICINE CLINIC | Facility: CLINIC | Age: 59
End: 2019-08-01

## 2019-08-07 ENCOUNTER — HOSPITAL ENCOUNTER (OUTPATIENT)
Dept: MAMMOGRAPHY | Facility: HOSPITAL | Age: 59
Discharge: HOME | End: 2019-08-07
Admitting: FAMILY MEDICINE

## 2019-08-07 ENCOUNTER — TELEPHONE (OUTPATIENT)
Dept: FAMILY MEDICINE CLINIC | Facility: CLINIC | Age: 59
End: 2019-08-07

## 2019-08-07 DIAGNOSIS — Z12.39 BREAST CANCER SCREENING: ICD-10-CM

## 2019-08-07 PROCEDURE — 77067 SCR MAMMO BI INCL CAD: CPT

## 2019-08-07 PROCEDURE — 77063 BREAST TOMOSYNTHESIS BI: CPT

## 2019-08-07 NOTE — TELEPHONE ENCOUNTER
Pt informed  ----- Message from EMMETT Cornejo sent at 8/7/2019  3:32 PM EDT -----  mammo normal, negative

## 2019-12-04 ENCOUNTER — TELEPHONE (OUTPATIENT)
Dept: FAMILY MEDICINE CLINIC | Facility: CLINIC | Age: 59
End: 2019-12-04

## 2019-12-04 ENCOUNTER — OFFICE VISIT (OUTPATIENT)
Dept: FAMILY MEDICINE CLINIC | Facility: CLINIC | Age: 59
End: 2019-12-04

## 2019-12-04 VITALS
TEMPERATURE: 97.8 F | HEIGHT: 64 IN | SYSTOLIC BLOOD PRESSURE: 128 MMHG | HEART RATE: 99 BPM | OXYGEN SATURATION: 97 % | BODY MASS INDEX: 44.39 KG/M2 | DIASTOLIC BLOOD PRESSURE: 84 MMHG | WEIGHT: 260 LBS

## 2019-12-04 DIAGNOSIS — R79.89 ABNORMAL THYROID BLOOD TEST: ICD-10-CM

## 2019-12-04 DIAGNOSIS — R73.9 HYPERGLYCEMIA: ICD-10-CM

## 2019-12-04 DIAGNOSIS — F32.A CHRONIC DEPRESSION: ICD-10-CM

## 2019-12-04 DIAGNOSIS — Z82.49 FAMILY HISTORY OF HEART DISEASE: ICD-10-CM

## 2019-12-04 DIAGNOSIS — D72.829 LEUKOCYTOSIS, UNSPECIFIED TYPE: Primary | ICD-10-CM

## 2019-12-04 DIAGNOSIS — Z72.0 TOBACCO ABUSE: ICD-10-CM

## 2019-12-04 DIAGNOSIS — Z00.00 ANNUAL PHYSICAL EXAM: Primary | ICD-10-CM

## 2019-12-04 DIAGNOSIS — E66.01 CLASS 3 SEVERE OBESITY DUE TO EXCESS CALORIES WITH SERIOUS COMORBIDITY AND BODY MASS INDEX (BMI) OF 40.0 TO 44.9 IN ADULT (HCC): ICD-10-CM

## 2019-12-04 LAB
25(OH)D3 SERPL-MCNC: 33.7 NG/ML (ref 30–100)
ALBUMIN SERPL-MCNC: 4.4 G/DL (ref 3.5–5.2)
ALBUMIN/GLOB SERPL: 1.3 G/DL
ALP SERPL-CCNC: 83 U/L (ref 39–117)
ALT SERPL W P-5'-P-CCNC: 21 U/L (ref 1–33)
ANION GAP SERPL CALCULATED.3IONS-SCNC: 13.1 MMOL/L (ref 5–15)
AST SERPL-CCNC: 18 U/L (ref 1–32)
BACTERIA UR QL AUTO: NORMAL /HPF
BILIRUB SERPL-MCNC: 0.3 MG/DL (ref 0.2–1.2)
BILIRUB UR QL STRIP: NEGATIVE
BUN BLD-MCNC: 11 MG/DL (ref 6–20)
BUN/CREAT SERPL: 12.5 (ref 7–25)
CALCIUM SPEC-SCNC: 9.2 MG/DL (ref 8.6–10.5)
CHLORIDE SERPL-SCNC: 99 MMOL/L (ref 98–107)
CHOLEST SERPL-MCNC: 169 MG/DL (ref 0–200)
CLARITY UR: CLEAR
CO2 SERPL-SCNC: 27.9 MMOL/L (ref 22–29)
COLOR UR: YELLOW
CREAT BLD-MCNC: 0.88 MG/DL (ref 0.57–1)
ERYTHROCYTE [DISTWIDTH] IN BLOOD BY AUTOMATED COUNT: 13.1 % (ref 12.3–15.4)
GFR SERPL CREATININE-BSD FRML MDRD: 66 ML/MIN/1.73
GLOBULIN UR ELPH-MCNC: 3.3 GM/DL
GLUCOSE BLD-MCNC: 103 MG/DL (ref 65–99)
GLUCOSE UR STRIP-MCNC: NEGATIVE MG/DL
HCT VFR BLD AUTO: 43.3 % (ref 34–46.6)
HDLC SERPL-MCNC: 73 MG/DL (ref 40–60)
HGB BLD-MCNC: 14 G/DL (ref 12–15.9)
HGB UR QL STRIP.AUTO: NEGATIVE
KETONES UR QL STRIP: NEGATIVE
LDLC SERPL CALC-MCNC: 75 MG/DL (ref 0–100)
LDLC/HDLC SERPL: 1.03 {RATIO}
LEUKOCYTE ESTERASE UR QL STRIP.AUTO: NEGATIVE
LYMPHOCYTES # BLD AUTO: 4.1 10*3/MM3 (ref 0.7–3.1)
LYMPHOCYTES NFR BLD AUTO: 32.5 % (ref 19.6–45.3)
MCH RBC QN AUTO: 29 PG (ref 26.6–33)
MCHC RBC AUTO-ENTMCNC: 32.3 G/DL (ref 31.5–35.7)
MCV RBC AUTO: 89.8 FL (ref 79–97)
MONOCYTES # BLD AUTO: 0.7 10*3/MM3 (ref 0.1–0.9)
MONOCYTES NFR BLD AUTO: 5.8 % (ref 5–12)
NEUTROPHILS # BLD AUTO: 7.8 10*3/MM3 (ref 1.7–7)
NEUTROPHILS NFR BLD AUTO: 61.7 % (ref 42.7–76)
NITRITE UR QL STRIP: NEGATIVE
PH UR STRIP.AUTO: 6.5 [PH] (ref 4.6–8)
PLATELET # BLD AUTO: 301 10*3/MM3 (ref 140–450)
PMV BLD AUTO: 7.5 FL (ref 6–12)
POTASSIUM BLD-SCNC: 4.7 MMOL/L (ref 3.5–5.2)
PROT SERPL-MCNC: 7.7 G/DL (ref 6–8.5)
PROT UR QL STRIP: NEGATIVE
RBC # BLD AUTO: 4.82 10*6/MM3 (ref 3.77–5.28)
RBC # UR: NORMAL /HPF
REF LAB TEST METHOD: NORMAL
SODIUM BLD-SCNC: 140 MMOL/L (ref 136–145)
SP GR UR STRIP: 1.02 (ref 1–1.03)
SQUAMOUS #/AREA URNS HPF: NORMAL /HPF
TRIGL SERPL-MCNC: 103 MG/DL (ref 0–150)
TSH SERPL DL<=0.05 MIU/L-ACNC: 4.52 UIU/ML (ref 0.27–4.2)
UROBILINOGEN UR QL STRIP: NORMAL
VLDLC SERPL-MCNC: 20.6 MG/DL (ref 5–40)
WBC NRBC COR # BLD: 12.6 10*3/MM3 (ref 3.4–10.8)
WBC UR QL AUTO: NORMAL /HPF

## 2019-12-04 PROCEDURE — 80061 LIPID PANEL: CPT | Performed by: NURSE PRACTITIONER

## 2019-12-04 PROCEDURE — 84443 ASSAY THYROID STIM HORMONE: CPT | Performed by: NURSE PRACTITIONER

## 2019-12-04 PROCEDURE — 99396 PREV VISIT EST AGE 40-64: CPT | Performed by: NURSE PRACTITIONER

## 2019-12-04 PROCEDURE — 85025 COMPLETE CBC W/AUTO DIFF WBC: CPT | Performed by: NURSE PRACTITIONER

## 2019-12-04 PROCEDURE — 82306 VITAMIN D 25 HYDROXY: CPT | Performed by: NURSE PRACTITIONER

## 2019-12-04 PROCEDURE — 81001 URINALYSIS AUTO W/SCOPE: CPT | Performed by: NURSE PRACTITIONER

## 2019-12-04 PROCEDURE — 80053 COMPREHEN METABOLIC PANEL: CPT | Performed by: NURSE PRACTITIONER

## 2019-12-04 PROCEDURE — 93000 ELECTROCARDIOGRAM COMPLETE: CPT | Performed by: NURSE PRACTITIONER

## 2019-12-04 PROCEDURE — 36415 COLL VENOUS BLD VENIPUNCTURE: CPT | Performed by: NURSE PRACTITIONER

## 2019-12-04 RX ORDER — ALBUTEROL SULFATE 90 UG/1
2 AEROSOL, METERED RESPIRATORY (INHALATION) EVERY 4 HOURS PRN
Qty: 1 INHALER | Refills: 11 | Status: SHIPPED | OUTPATIENT
Start: 2019-12-04

## 2019-12-04 NOTE — TELEPHONE ENCOUNTER
WBC is still elevated 12.3, has been chronically high recommend hematology consult to [facility]ano. Vit D, kidney and liver normal. Thyroid sl abnl, could be contributing to your weight gain RTC 2 wks lab only recheck thyroid panel. BS mildly elevated 103 enc healthy diet and regular exercise. Chol well controlled. No anemias, urine normal.

## 2019-12-04 NOTE — PROGRESS NOTES
"Subjective   Lena Kirkpatrick is a 58 y.o. female.     History of Present Illness   Here for annual physical fasting for labs, with chronic HTN prev on lisinopril 10 mg daily no CP dizziness HA LE edema, no regular checks BP at home, thinks too strong and causing lightheaded and \"fog\" thinks prev stress with son c/t BP and doesn't want to take anymore, has decreased lisinopril 5 mg still with sx, With weight gain 30 lbs since last OV attributes to chronic depression not well controlled now seeing therapist, stopped lexapro 20 mg daily and wellbutrin  mg BID, now on zoloft 50 mg trying to get motivated to eat healthy and start exercising, fam hx mom and dad and brother and grandparents heart disease  still smoking 1 ppd but doesn't finish entire cigarette and would like to quit smoking, prev tried chantix and didn't like the way it made her feel, last CXR 11/16 NAD, with chronic cough was [facility] and 07/17 and tx with some improvement doesn't use albuterol regularly request refill, tried patches during illness but burned skin and stopped, wellbutrin no help to quit  Sees GYN Nirmala noticed low vit D on supplement, UTD pap and mammo 08/19, UTD colonoscopy 10/17 Dr Noel GOINS 5 years, last pna shot 08/16    The following portions of the patient's history were reviewed and updated as appropriate: allergies, current medications, past family history, past medical history, past social history, past surgical history and problem list.    Review of Systems   Constitutional: Negative for fever.   Respiratory: Negative for cough, shortness of breath and wheezing.    Cardiovascular: Negative for chest pain, palpitations and leg swelling.   Gastrointestinal: Negative for abdominal distention, abdominal pain, anal bleeding, blood in stool, constipation, diarrhea, nausea, rectal pain and vomiting.   Endocrine: Negative for cold intolerance, heat intolerance, polydipsia, polyphagia and polyuria.   Genitourinary: " Negative for decreased urine volume, difficulty urinating, dyspareunia, dysuria, enuresis, flank pain, frequency, genital sores, hematuria, menstrual problem, pelvic pain, urgency, vaginal bleeding, vaginal discharge and vaginal pain.   Musculoskeletal: Negative for arthralgias and back pain.   Neurological: Positive for dizziness (intermittent). Negative for headaches.   Psychiatric/Behavioral: Positive for behavioral problems and dysphoric mood. Negative for agitation, confusion, decreased concentration, hallucinations, self-injury, sleep disturbance and suicidal ideas. The patient is nervous/anxious. The patient is not hyperactive.    All other systems reviewed and are negative.      Objective   Physical Exam   Constitutional: She is oriented to person, place, and time. She appears well-developed and well-nourished.   HENT:   Head: Normocephalic and atraumatic.   Right Ear: Hearing and tympanic membrane normal.   Left Ear: Hearing and tympanic membrane normal.   Nose: Nose normal. Right sinus exhibits no maxillary sinus tenderness and no frontal sinus tenderness. Left sinus exhibits no maxillary sinus tenderness and no frontal sinus tenderness.   Mouth/Throat: Oropharynx is clear and moist.   Eyes: Conjunctivae and EOM are normal. Pupils are equal, round, and reactive to light.   Neck: Normal range of motion. Neck supple. No thyromegaly present.   Cardiovascular: Normal rate, regular rhythm and normal heart sounds.   Pulmonary/Chest: Effort normal and breath sounds normal.   Abdominal: Soft. She exhibits no distension and no mass. There is no tenderness. There is no rebound, no guarding and no CVA tenderness. No hernia.   Musculoskeletal: Normal range of motion.   Lymphadenopathy:     She has no cervical adenopathy.   Neurological: She is alert and oriented to person, place, and time.   Skin: Skin is warm and dry.   Psychiatric: She has a normal mood and affect. Her behavior is normal. Judgment and thought  content normal.   Vitals reviewed.    ECG 12 Lead  Date/Time: 12/4/2019 11:10 AM  Performed by: Sharron Lane APRN  Authorized by: Sharron Lane APRN   Comparison: compared with previous ECG from 12/15/2011  Similar to previous ECG  Rhythm: sinus rhythm  Rate: normal  Conduction: conduction normal  ST Segments: ST segments normal  T Waves: T waves normal  QRS axis: normal  Other: no other findings    Clinical impression: normal ECG          Assessment/Plan   Lena was seen today for annual exam.    Diagnoses and all orders for this visit:    Annual physical exam  -     CBC & Differential  -     Comprehensive Metabolic Panel  -     Lipid Panel  -     TSH  -     Urinalysis With Microscopic - Urine, Clean Catch  -     Vitamin D 25 Hydroxy  -     CBC Auto Differential  -     Urinalysis without microscopic (no culture) - Urine, Clean Catch  -     Urinalysis, Microscopic Only - Urine, Clean Catch    Tobacco abuse    Class 3 severe obesity due to excess calories with serious comorbidity and body mass index (BMI) of 40.0 to 44.9 in adult (CMS/[facility])    Family history of heart disease    Chronic depression    Other orders  -     ECG 12 Lead  -     albuterol sulfate  (90 Base) MCG/ACT inhaler; Inhale 2 puffs Every 4 (Four) Hours As Needed for Wheezing or Shortness of Air.    complete physical today, EKG NAD defer update CXR, enc smoking cessation, check labs and call with results, Enc healthy diet and regular exercise for wt loss, cont FU with therapist and psych, monitor BP after stopping lisinopril 5 mg daily and call with log in 2-4 wks, refill albuterol inhaler prn

## 2019-12-04 NOTE — PATIENT INSTRUCTIONS
"complete physical today, EKG NAD defer update CXR, enc smoking cessation, check labs and call with results, Enc healthy diet and regular exercise for wt loss, cont FU with therapist and psych, monitor BP after stopping lisinopril 5 mg daily and call with log in 2-4 wks, refill albuterol inhaler prn  DASH Eating Plan  DASH stands for \"Dietary Approaches to Stop Hypertension.\" The DASH eating plan is a healthy eating plan that has been shown to reduce high blood pressure (hypertension). It may also reduce your risk for type 2 diabetes, heart disease, and stroke. The DASH eating plan may also help with weight loss.  What are tips for following this plan?    General guidelines  · Avoid eating more than 2,300 mg (milligrams) of salt (sodium) a day. If you have hypertension, you may need to reduce your sodium intake to 1,500 mg a day.  · Limit alcohol intake to no more than 1 drink a day for nonpregnant women and 2 drinks a day for men. One drink equals 12 oz of beer, 5 oz of wine, or 1½ oz of hard liquor.  · Work with your health care provider to maintain a healthy body weight or to lose weight. Ask what an ideal weight is for you.  · Get at least 30 minutes of exercise that causes your heart to beat faster (aerobic exercise) most days of the week. Activities may include walking, swimming, or biking.  · Work with your health care provider or diet and nutrition specialist (dietitian) to adjust your eating plan to your individual calorie needs.  Reading food labels    · Check food labels for the amount of sodium per serving. Choose foods with less than 5 percent of the Daily Value of sodium. Generally, foods with less than 300 mg of sodium per serving fit into this eating plan.  · To find whole grains, look for the word \"whole\" as the first word in the ingredient list.  Shopping  · Buy products labeled as \"low-sodium\" or \"no salt added.\"  · Buy fresh foods. Avoid canned foods and premade or frozen meals.  Cooking  · Avoid " adding salt when cooking. Use salt-free seasonings or herbs instead of table salt or sea salt. Check with your health care provider or pharmacist before using salt substitutes.  · Do not romo foods. Cook foods using healthy methods such as baking, boiling, grilling, and broiling instead.  · Cook with heart-healthy oils, such as olive, canola, soybean, or sunflower oil.  Meal planning  · Eat a balanced diet that includes:  ? 5 or more servings of fruits and vegetables each day. At each meal, try to fill half of your plate with fruits and vegetables.  ? Up to 6-8 servings of whole grains each day.  ? Less than 6 oz of lean meat, poultry, or fish each day. A 3-oz serving of meat is about the same size as a deck of cards. One egg equals 1 oz.  ? 2 servings of low-fat dairy each day.  ? A serving of nuts, seeds, or beans 5 times each week.  ? Heart-healthy fats. Healthy fats called Omega-3 fatty acids are found in foods such as flaxseeds and coldwater fish, like sardines, salmon, and mackerel.  · Limit how much you eat of the following:  ? Canned or prepackaged foods.  ? Food that is high in trans fat, such as fried foods.  ? Food that is high in saturated fat, such as fatty meat.  ? Sweets, desserts, sugary drinks, and other foods with added sugar.  ? Full-fat dairy products.  · Do not salt foods before eating.  · Try to eat at least 2 vegetarian meals each week.  · Eat more home-cooked food and less restaurant, buffet, and fast food.  · When eating at a restaurant, ask that your food be prepared with less salt or no salt, if possible.  What foods are recommended?  The items listed may not be a complete list. Talk with your dietitian about what dietary choices are best for you.  Grains  Whole-grain or whole-wheat bread. Whole-grain or whole-wheat pasta. Brown rice. Oatmeal. Quinoa. Bulgur. Whole-grain and low-sodium cereals. Vale bread. Low-fat, low-sodium crackers. Whole-wheat flour tortillas.  Vegetables  Fresh or  frozen vegetables (raw, steamed, roasted, or grilled). Low-sodium or reduced-sodium tomato and vegetable juice. Low-sodium or reduced-sodium tomato sauce and tomato paste. Low-sodium or reduced-sodium canned vegetables.  Fruits  All fresh, dried, or frozen fruit. Canned fruit in natural juice (without added sugar).  Meat and other protein foods  Skinless chicken or turkey. Ground chicken or turkey. Pork with fat trimmed off. Fish and seafood. Egg whites. Dried beans, peas, or lentils. Unsalted nuts, nut butters, and seeds. Unsalted canned beans. Lean cuts of beef with fat trimmed off. Low-sodium, lean deli meat.  Dairy  Low-fat (1%) or fat-free (skim) milk. Fat-free, low-fat, or reduced-fat cheeses. Nonfat, low-sodium ricotta or cottage cheese. Low-fat or nonfat yogurt. Low-fat, low-sodium cheese.  Fats and oils  Soft margarine without trans fats. Vegetable oil. Low-fat, reduced-fat, or light mayonnaise and salad dressings (reduced-sodium). Canola, safflower, olive, soybean, and sunflower oils. Avocado.  Seasoning and other foods  Herbs. Spices. Seasoning mixes without salt. Unsalted popcorn and pretzels. Fat-free sweets.  What foods are not recommended?  The items listed may not be a complete list. Talk with your dietitian about what dietary choices are best for you.  Grains  Baked goods made with fat, such as croissants, muffins, or some breads. Dry pasta or rice meal packs.  Vegetables  Creamed or fried vegetables. Vegetables in a cheese sauce. Regular canned vegetables (not low-sodium or reduced-sodium). Regular canned tomato sauce and paste (not low-sodium or reduced-sodium). Regular tomato and vegetable juice (not low-sodium or reduced-sodium). Pickles. Olives.  Fruits  Canned fruit in a light or heavy syrup. Fried fruit. Fruit in cream or butter sauce.  Meat and other protein foods  Fatty cuts of meat. Ribs. Fried meat. Alfaro. Sausage. Bologna and other processed lunch meats. Salami. Fatback. Hotdogs.  Bratwurst. Salted nuts and seeds. Canned beans with added salt. Canned or smoked fish. Whole eggs or egg yolks. Chicken or turkey with skin.  Dairy  Whole or 2% milk, cream, and half-and-half. Whole or full-fat cream cheese. Whole-fat or sweetened yogurt. Full-fat cheese. Nondairy creamers. Whipped toppings. Processed cheese and cheese spreads.  Fats and oils  Butter. Stick margarine. Lard. Shortening. Ghee. Alfaro fat. Tropical oils, such as coconut, palm kernel, or palm oil.  Seasoning and other foods  Salted popcorn and pretzels. Onion salt, garlic salt, seasoned salt, table salt, and sea salt. Worcestershire sauce. Tartar sauce. Barbecue sauce. Teriyaki sauce. Soy sauce, including reduced-sodium. Steak sauce. Canned and packaged gravies. Fish sauce. Oyster sauce. Cocktail sauce. Horseradish that you find on the shelf. Ketchup. Mustard. Meat flavorings and tenderizers. Bouillon cubes. Hot sauce and Tabasco sauce. Premade or packaged marinades. Premade or packaged taco seasonings. Relishes. Regular salad dressings.  Where to find more information:  · National Heart, Lung, and Blood [place]: www.nhlbi.nih.gov  · American Heart Association: www.heart.org  Summary  · The DASH eating plan is a healthy eating plan that has been shown to reduce high blood pressure (hypertension). It may also reduce your risk for type 2 diabetes, heart disease, and stroke.  · With the DASH eating plan, you should limit salt (sodium) intake to 2,300 mg a day. If you have hypertension, you may need to reduce your sodium intake to 1,500 mg a day.  · When on the DASH eating plan, aim to eat more fresh fruits and vegetables, whole grains, lean proteins, low-fat dairy, and heart-healthy fats.  · Work with your health care provider or diet and nutrition specialist (dietitian) to adjust your eating plan to your individual calorie needs.  This information is not intended to replace advice given to you by your health care provider. Make sure you  discuss any questions you have with your health care provider.  Document Released: 12/06/2012 Document Revised: 12/11/2017 Document Reviewed: 12/11/2017  [employer] Interactive Patient Education © 2019 [employer]

## 2019-12-05 ENCOUNTER — TELEPHONE (OUTPATIENT)
Dept: FAMILY MEDICINE CLINIC | Facility: CLINIC | Age: 59
End: 2019-12-05

## 2019-12-05 NOTE — TELEPHONE ENCOUNTER
Chronically elevated WBC see prev telephone message regarding labs    PATIENT WOULD LIKE TO KNOW WHY SHE IS BEING REFERRED TO HEMATOLOGIST

## 2019-12-05 NOTE — TELEPHONE ENCOUNTER
Pt informed. Nirmala wallace is GYN. Sent results. Please send labs to the person she gave you information about.

## 2019-12-06 ENCOUNTER — TELEPHONE (OUTPATIENT)
Dept: FAMILY MEDICINE CLINIC | Facility: CLINIC | Age: 59
End: 2019-12-06

## 2019-12-27 ENCOUNTER — LAB (OUTPATIENT)
Dept: FAMILY MEDICINE CLINIC | Facility: CLINIC | Age: 59
End: 2019-12-27

## 2019-12-27 DIAGNOSIS — R79.89 ABNORMAL THYROID BLOOD TEST: ICD-10-CM

## 2019-12-27 DIAGNOSIS — R73.9 HYPERGLYCEMIA: ICD-10-CM

## 2019-12-27 LAB
HBA1C MFR BLD: 6 % (ref 4.8–5.6)
T-UPTAKE NFR SERPL: 1.15 TBI (ref 0.8–1.3)
T4 SERPL-MCNC: 5.98 MCG/DL (ref 4.5–11.7)
TSH SERPL DL<=0.05 MIU/L-ACNC: 4.86 UIU/ML (ref 0.27–4.2)

## 2019-12-27 PROCEDURE — 36415 COLL VENOUS BLD VENIPUNCTURE: CPT | Performed by: NURSE PRACTITIONER

## 2019-12-27 PROCEDURE — 83036 HEMOGLOBIN GLYCOSYLATED A1C: CPT | Performed by: NURSE PRACTITIONER

## 2019-12-27 PROCEDURE — 84479 ASSAY OF THYROID (T3 OR T4): CPT | Performed by: NURSE PRACTITIONER

## 2019-12-27 PROCEDURE — 84436 ASSAY OF TOTAL THYROXINE: CPT | Performed by: NURSE PRACTITIONER

## 2019-12-27 PROCEDURE — 84443 ASSAY THYROID STIM HORMONE: CPT | Performed by: NURSE PRACTITIONER

## 2019-12-28 LAB — THYROPEROXIDASE AB SERPL-ACNC: 13 IU/ML (ref 0–34)

## 2019-12-30 ENCOUNTER — TELEPHONE (OUTPATIENT)
Dept: FAMILY MEDICINE CLINIC | Facility: CLINIC | Age: 59
End: 2019-12-30

## 2019-12-30 NOTE — TELEPHONE ENCOUNTER
Thyroid recheck still sl abnl, thyroid antibody normal, no autoimmune. We will monitor. A1C elevated 6, prediabetic. Recommend healthy diet and exercise and we can monitor.

## 2020-01-10 ENCOUNTER — LAB (OUTPATIENT)
Dept: LAB | Facility: HOSPITAL | Age: 60
End: 2020-01-10

## 2020-01-10 ENCOUNTER — CONSULT (OUTPATIENT)
Dept: ONCOLOGY | Facility: CLINIC | Age: 60
End: 2020-01-10

## 2020-01-10 VITALS
WEIGHT: 259.1 LBS | HEART RATE: 85 BPM | BODY MASS INDEX: 44.24 KG/M2 | SYSTOLIC BLOOD PRESSURE: 136 MMHG | DIASTOLIC BLOOD PRESSURE: 85 MMHG | HEIGHT: 64 IN | RESPIRATION RATE: 12 BRPM | TEMPERATURE: 97.5 F | OXYGEN SATURATION: 96 %

## 2020-01-10 DIAGNOSIS — F41.9 ANXIETY: Primary | ICD-10-CM

## 2020-01-10 DIAGNOSIS — D72.820 LYMPHOCYTOSIS: Primary | ICD-10-CM

## 2020-01-10 LAB
ALBUMIN SERPL-MCNC: 4.4 G/DL (ref 3.5–5.2)
ALBUMIN/GLOB SERPL: 1.3 G/DL (ref 1.1–2.4)
ALP SERPL-CCNC: 80 U/L (ref 38–116)
ALT SERPL W P-5'-P-CCNC: 17 U/L (ref 0–33)
ANION GAP SERPL CALCULATED.3IONS-SCNC: 11 MMOL/L (ref 5–15)
AST SERPL-CCNC: 19 U/L (ref 0–32)
BASOPHILS # BLD AUTO: 0.07 10*3/MM3 (ref 0–0.2)
BASOPHILS NFR BLD AUTO: 0.6 % (ref 0–1.5)
BILIRUB SERPL-MCNC: 0.3 MG/DL (ref 0.2–1.2)
BUN BLD-MCNC: 10 MG/DL (ref 6–20)
BUN/CREAT SERPL: 12.2 (ref 7.3–30)
CALCIUM SPEC-SCNC: 9.4 MG/DL (ref 8.5–10.2)
CHLORIDE SERPL-SCNC: 101 MMOL/L (ref 98–107)
CO2 SERPL-SCNC: 29 MMOL/L (ref 22–29)
CREAT BLD-MCNC: 0.82 MG/DL (ref 0.6–1.1)
DEPRECATED RDW RBC AUTO: 41.7 FL (ref 37–54)
EOSINOPHIL # BLD AUTO: 0.15 10*3/MM3 (ref 0–0.4)
EOSINOPHIL NFR BLD AUTO: 1.3 % (ref 0.3–6.2)
ERYTHROCYTE [DISTWIDTH] IN BLOOD BY AUTOMATED COUNT: 12.1 % (ref 12.3–15.4)
GFR SERPL CREATININE-BSD FRML MDRD: 72 ML/MIN/1.73
GLOBULIN UR ELPH-MCNC: 3.4 GM/DL (ref 1.8–3.5)
GLUCOSE BLD-MCNC: 107 MG/DL (ref 74–124)
HCT VFR BLD AUTO: 42.3 % (ref 34–46.6)
HGB BLD-MCNC: 13.6 G/DL (ref 12–15.9)
IMM GRANULOCYTES # BLD AUTO: 0.08 10*3/MM3 (ref 0–0.05)
IMM GRANULOCYTES NFR BLD AUTO: 0.7 % (ref 0–0.5)
LDH SERPL-CCNC: 171 U/L (ref 99–259)
LYMPHOCYTES # BLD AUTO: 4.28 10*3/MM3 (ref 0.7–3.1)
LYMPHOCYTES NFR BLD AUTO: 35.8 % (ref 19.6–45.3)
MCH RBC QN AUTO: 30.1 PG (ref 26.6–33)
MCHC RBC AUTO-ENTMCNC: 32.2 G/DL (ref 31.5–35.7)
MCV RBC AUTO: 93.6 FL (ref 79–97)
MONOCYTES # BLD AUTO: 0.71 10*3/MM3 (ref 0.1–0.9)
MONOCYTES NFR BLD AUTO: 5.9 % (ref 5–12)
NEUTROPHILS # BLD AUTO: 6.65 10*3/MM3 (ref 1.7–7)
NEUTROPHILS NFR BLD AUTO: 55.7 % (ref 42.7–76)
NRBC BLD AUTO-RTO: 0 /100 WBC (ref 0–0.2)
PLATELET # BLD AUTO: 271 10*3/MM3 (ref 140–450)
PMV BLD AUTO: 9.7 FL (ref 6–12)
POTASSIUM BLD-SCNC: 4.4 MMOL/L (ref 3.5–4.7)
PROT SERPL-MCNC: 7.8 G/DL (ref 6.3–8)
RBC # BLD AUTO: 4.52 10*6/MM3 (ref 3.77–5.28)
SODIUM BLD-SCNC: 141 MMOL/L (ref 134–145)
WBC NRBC COR # BLD: 11.94 10*3/MM3 (ref 3.4–10.8)

## 2020-01-10 PROCEDURE — 36416 COLLJ CAPILLARY BLOOD SPEC: CPT

## 2020-01-10 PROCEDURE — 36415 COLL VENOUS BLD VENIPUNCTURE: CPT | Performed by: INTERNAL MEDICINE

## 2020-01-10 PROCEDURE — 85025 COMPLETE CBC W/AUTO DIFF WBC: CPT

## 2020-01-10 PROCEDURE — 83615 LACTATE (LD) (LDH) ENZYME: CPT | Performed by: INTERNAL MEDICINE

## 2020-01-10 PROCEDURE — 80053 COMPREHEN METABOLIC PANEL: CPT | Performed by: INTERNAL MEDICINE

## 2020-01-10 PROCEDURE — 99245 OFF/OP CONSLTJ NEW/EST HI 55: CPT | Performed by: INTERNAL MEDICINE

## 2020-01-10 NOTE — PROGRESS NOTES
Subjective     REASON FOR CONSULTATION: Lymphocytosis  Provide an opinion on any further workup or treatment                             REQUESTING PRACTITIONER:  Sharron Lane    RECORDS OBTAINED:  Records of the patients history including those obtained from the referring provider were reviewed and summarized in detail.    History of Present Illness   This is a pleasant 59-year-old woman seen today for evaluation of an elevated white blood cell count.  Reviewing the patient's records, she has had an elevated white blood cell count to a mild degree since at least 2013.  A CBC on care everywhere 2/16/2013 showed a white blood cell count 14.5 with elevated neutrophils at that time.  More recent CBCs in the [facility] system dating to August 2016 show a white blood cell count ranging between 9.9 and 12.6.  Most of her CBC showed a mild lymphocytosis with an absolute lymphocyte count typically around 4000.  The patient has complaints of 30 pound weight gain over the past 1 year, fatigue, hypersomnolence, she oftentimes feels feverish without elevated temperature.  She has occasional mild night sweats mostly dampness on the back of the neck but no drenching full body sweats.  She does complain of dull pain or increased fullness sensation in the left upper quadrant laterally which is been intermittently present for approximately 5 years.  She states she was treated for H. pylori about 5 years ago with temporary relief of the symptoms but symptoms have now recurred.  She denies bright red blood per rectum or melanotic stool.  She does smoke tobacco 1 pack of cigarettes per day for 45 years.  She has not been undergoing CT lung cancer screening.  She has a familial history of leukemias, unknown subtypes.    Past Medical History:   Diagnosis Date   • Abdominal pain    • Anxiety    • Back pain    • Cough    • CTS (carpal tunnel syndrome)    • Depression    • Dizziness    • Fatigue     excessive   • Heat intolerance    •  Hemorrhoids    • Hemorrhoids    • History of colon polyps    • Hypertension    • Numbness    • Ocular migraine    • Palpitation    • Poor vision    • Sexually transmitted disease     venereal disease   • Shortness of breath    • Soreness of tongue    • Weight gain         Past Surgical History:   Procedure Laterality Date   • COLONOSCOPY N/A 10/19/2017    Procedure: COLONOSCOPY TO CECUM WITH POLYPECTOMY ( HOT SNARE;  Surgeon: Rosi Cohen MD;  Location: [facility] ENDOSCOPY;  Service:    • COLONOSCOPY  1997   • HYSTERECTOMY      Partial   • PARTIAL HYSTERECTOMY  02/2013        Current Outpatient Medications on File Prior to Visit   Medication Sig Dispense Refill   • albuterol sulfate  (90 Base) MCG/ACT inhaler Inhale 2 puffs Every 4 (Four) Hours As Needed for Wheezing or Shortness of Air. 1 inhaler 11   • lisinopril (PRINIVIL,ZESTRIL) 10 MG tablet Take 1 tablet by mouth Daily. One PO daily for BP 90 tablet 3   • sertraline (ZOLOFT) 50 MG tablet Take 50 mg by mouth Daily.     • [DISCONTINUED] loratadine (CLARITIN) 10 MG tablet Take 10 mg by mouth Daily.       No current facility-administered medications on file prior to visit.         ALLERGIES:  No Known Allergies     Social History     Socioeconomic History   • Marital status: Single     Spouse name: Not on file   • Number of children: Not on file   • Years of education: Not on file   • Highest education level: Not on file   Occupational History     Employer: [employer]   Tobacco Use   • Smoking status: Current Every Day Smoker     Packs/day: 1.00     Types: Cigarettes   • Smokeless tobacco: Never Used   Substance and Sexual Activity   • Alcohol use: No   • Drug use: Defer   • Sexual activity: Defer        Family History   Problem Relation Age of Onset   • Hypertension Mother    • Thyroid disease Mother    • Depression Mother    • Anxiety disorder Mother    • Diverticulitis Mother    • Heart disease Father    • Hypertension Father    • Diabetes Father   "  • Kidney disease Father    • Leukemia Sister    • Heart disease Brother    • Heart disease Maternal Grandmother    • Other Maternal Grandfather         coal mining accident   • Diabetes Paternal Grandmother    • Heart disease Paternal Grandmother    • Heart disease Brother         Review of Systems   Constitutional: Positive for diaphoresis, fatigue and unexpected weight change (30 pound weight gain). Negative for activity change and fever.   HENT: Positive for congestion. Negative for hearing loss and mouth sores.    Eyes: Positive for visual disturbance. Negative for photophobia.   Respiratory: Positive for cough and shortness of breath. Negative for wheezing.    Cardiovascular: Negative.    Gastrointestinal: Positive for abdominal pain and blood in stool. Negative for constipation and diarrhea.   Genitourinary: Negative.    Musculoskeletal: Positive for back pain. Negative for arthralgias and joint swelling.   Skin: Negative.    Allergic/Immunologic: Negative.    Neurological: Positive for dizziness, numbness and headaches. Negative for weakness.   Hematological: Negative.    Psychiatric/Behavioral: Negative.         Objective     Vitals:    01/10/20 1045   BP: 136/85   Pulse: 85   Resp: 12   Temp: 97.5 °F (36.4 °C)   TempSrc: Oral   SpO2: 96%   Weight: 118 kg (259 lb 1.6 oz)   Height: 162.8 cm (64.09\")   PainSc: 0-No pain     Current Status 1/10/2020   ECOG score 0       Physical Exam    CON: pleasant well-developed obese woman  HEENT: no icterus, no thrush, moist membranes  NECK: no jvd  LYMPH: no cervical or supraclavicular lad  CV: RRR, S1S2, no murmur  RESP: cta bilat, no wheezing, no rales  GI: soft, non-tender, no splenomegaly but exam limited by habitus, +bs  MUSC: no edema, normal gait  NEURO: alert and oriented x3, normal strength  PSYCH: normal mood and affect  SKIN: no rash or induration    RECENT LABS:  Hematology WBC   Date Value Ref Range Status   01/10/2020 11.94 (H) 3.40 - 10.80 10*3/mm3 Final "     RBC   Date Value Ref Range Status   01/10/2020 4.52 3.77 - 5.28 10*6/mm3 Final     Hemoglobin   Date Value Ref Range Status   01/10/2020 13.6 12.0 - 15.9 g/dL Final     Hematocrit   Date Value Ref Range Status   01/10/2020 42.3 34.0 - 46.6 % Final     Platelets   Date Value Ref Range Status   01/10/2020 271 140 - 450 10*3/mm3 Final        Lab Results   Component Value Date    GLUCOSE 103 (H) 12/04/2019    BUN 11 12/04/2019    CREATININE 0.88 12/04/2019    EGFRIFNONA 66 12/04/2019    BCR 12.5 12/04/2019    K 4.7 12/04/2019    CO2 27.9 12/04/2019    CALCIUM 9.2 12/04/2019    ALBUMIN 4.40 12/04/2019    AST 18 12/04/2019    ALT 21 12/04/2019     CT abdomen/pelvis 11/8/2018IMPRESSION:  1.  Findings of colitis versus diverticulitis involving a short segment  of the descending colon. While no discrete diverticula are visualized  within the involved bowel segment, diverticulitis is much more common  than infectious colitis. Correlation with patient history is  recommended.    Assessment/Plan     1.  Lymphocytosis present for at least 3-year duration  2.  Left upper quadrant abdominal pain, fatigue, feverish  3.  Tobacco abuse    I discussed with the patient further evaluation of her elevated white blood cell count and constitutional complaints with a peripheral blood flow cytometry to evaluate for lymphoproliferative disorders.  Lymphocytosis can be associated with splenomegaly so given the patient's complaint of left upper quadrant pain and difficult exam secondary to habitus I ordered CT of the abdomen and pelvis to assess for splenomegaly and also any abdominal/pelvic lymphadenopathy.  She complains of cough and shortness of breath so I will include CT of the chest and a CT of the neck to assess lymphadenopathy.  I will check an ESR.  If the peripheral blood flow cytometry is negative for monoclonal populations her elevated white blood cell count may be reactive related to smoking.  She complains of fatigue and  somnolence during the day which I would not be surprised is related to undiagnosed sleep apnea.    We will see the patient back in 1 to 2 weeks to review results of today's ordered evaluation.  Thank you for allowing me to participate in the care of this nice patient.

## 2020-01-13 LAB — REF LAB TEST METHOD: NORMAL

## 2020-01-16 ENCOUNTER — HOSPITAL ENCOUNTER (OUTPATIENT)
Dept: PET IMAGING | Facility: HOSPITAL | Age: 60
Discharge: HOME | End: 2020-01-16
Admitting: INTERNAL MEDICINE

## 2020-01-16 DIAGNOSIS — D72.820 LYMPHOCYTOSIS: ICD-10-CM

## 2020-01-16 LAB — CREAT BLDA-MCNC: 0.9 MG/DL (ref 0.6–1.3)

## 2020-01-16 PROCEDURE — 70491 CT SOFT TISSUE NECK W/DYE: CPT

## 2020-01-16 PROCEDURE — 82565 ASSAY OF CREATININE: CPT

## 2020-01-16 PROCEDURE — 74177 CT ABD & PELVIS W/CONTRAST: CPT

## 2020-01-16 PROCEDURE — 25010000002 IOPAMIDOL 61 % SOLUTION: Performed by: INTERNAL MEDICINE

## 2020-01-16 PROCEDURE — 0 DIATRIZOATE MEGLUMINE & SODIUM PER 1 ML: Performed by: INTERNAL MEDICINE

## 2020-01-16 PROCEDURE — 71260 CT THORAX DX C+: CPT

## 2020-01-16 RX ADMIN — IOPAMIDOL 85 ML: 612 INJECTION, SOLUTION INTRAVENOUS at 13:28

## 2020-01-16 RX ADMIN — DIATRIZOATE MEGLUMINE AND DIATRIZOATE SODIUM 30 ML: 660; 100 LIQUID ORAL; RECTAL at 12:41

## 2020-01-20 ENCOUNTER — OFFICE VISIT (OUTPATIENT)
Dept: ONCOLOGY | Facility: CLINIC | Age: 60
End: 2020-01-20

## 2020-01-20 ENCOUNTER — APPOINTMENT (OUTPATIENT)
Dept: LAB | Facility: HOSPITAL | Age: 60
End: 2020-01-20

## 2020-01-20 VITALS
RESPIRATION RATE: 16 BRPM | HEIGHT: 64 IN | TEMPERATURE: 97.8 F | DIASTOLIC BLOOD PRESSURE: 84 MMHG | HEART RATE: 82 BPM | WEIGHT: 260.8 LBS | BODY MASS INDEX: 44.53 KG/M2 | OXYGEN SATURATION: 95 % | SYSTOLIC BLOOD PRESSURE: 154 MMHG

## 2020-01-20 DIAGNOSIS — R53.83 FATIGUE, UNSPECIFIED TYPE: ICD-10-CM

## 2020-01-20 DIAGNOSIS — N83.202 CYST OF LEFT OVARY: ICD-10-CM

## 2020-01-20 DIAGNOSIS — D72.820 LYMPHOCYTOSIS: Primary | ICD-10-CM

## 2020-01-20 PROCEDURE — 99213 OFFICE O/P EST LOW 20 MIN: CPT | Performed by: INTERNAL MEDICINE

## 2020-01-20 RX ORDER — ESTRADIOL 0.1 MG/G
2 CREAM VAGINAL DAILY
COMMUNITY
End: 2020-06-29

## 2020-01-20 NOTE — PROGRESS NOTES
Subjective     REASON FOR CONSULTATION: Lymphocytosis  Provide an opinion on any further workup or treatment                             REQUESTING PRACTITIONER:  Sharron Lane    RECORDS OBTAINED:  Records of the patients history including those obtained from the referring provider were reviewed and summarized in detail.    History of Present Illness   This is a pleasant 59-year-old woman seen today for evaluation of an elevated white blood cell count.  Reviewing the patient's records, she has had an elevated white blood cell count to a mild degree since at least 2013.  A CBC on care everywhere 2/16/2013 showed a white blood cell count 14.5 with elevated neutrophils at that time.  More recent CBCs in the [facility] system dating to August 2016 show a white blood cell count ranging between 9.9 and 12.6.  Most of her CBC showed a mild lymphocytosis with an absolute lymphocyte count typically around 4000.  The patient has complaints of 30 pound weight gain over the past 1 year, fatigue, hypersomnolence, she oftentimes feels feverish without elevated temperature.  She has occasional mild night sweats mostly dampness on the back of the neck but no drenching full body sweats.  She does complain of dull pain or increased fullness sensation in the left upper quadrant laterally which is been intermittently present for approximately 5 years.  She states she was treated for H. pylori about 5 years ago with temporary relief of the symptoms but symptoms have now recurred.  She denies bright red blood per rectum or melanotic stool.  She does smoke tobacco 1 pack of cigarettes per day for 45 years.  She has not been undergoing CT lung cancer screening.  She has a familial history of leukemias, unknown subtypes.    When seen on 1/10/2028 peripheral blood flow cytometry was performed which showed no monoclonal B-cell populations.  CT of the neck, chest, abdomen and pelvis showed no significant lymphadenopathy or splenomegaly.   There was fatty infiltration of the liver.  There was a low-density mass in the left hemo-pelvis possibly representing an ovarian cyst, hydrosalpinx, less likely lymphadenopathy.    Past Medical History:   Diagnosis Date   • Abdominal pain    • Anxiety    • Back pain    • Cough    • CTS (carpal tunnel syndrome)    • Depression    • Dizziness    • Fatigue     excessive   • Heat intolerance    • Hemorrhoids    • History of colon polyps    • Hypertension    • Numbness    • Ocular migraine    • Palpitation    • Poor vision    • Sexually transmitted disease     venereal disease   • Shortness of breath    • Soreness of tongue    • Weight gain         Past Surgical History:   Procedure Laterality Date   • COLONOSCOPY N/A 10/19/2017    Procedure: COLONOSCOPY TO CECUM WITH POLYPECTOMY ( HOT SNARE;  Surgeon: Rosi Cohen MD;  Location: [facility] ENDOSCOPY;  Service:    • COLONOSCOPY  1997   • HYSTERECTOMY      Partial   • PARTIAL HYSTERECTOMY  02/2013        Current Outpatient Medications on File Prior to Visit   Medication Sig Dispense Refill   • albuterol sulfate  (90 Base) MCG/ACT inhaler Inhale 2 puffs Every 4 (Four) Hours As Needed for Wheezing or Shortness of Air. 1 inhaler 11   • estradiol (ESTRACE) 0.1 MG/GM vaginal cream Insert 2 g into the vagina Daily.     • sertraline (ZOLOFT) 50 MG tablet Take 50 mg by mouth Daily.     • lisinopril (PRINIVIL,ZESTRIL) 10 MG tablet Take 1 tablet by mouth Daily. One PO daily for BP 90 tablet 3     No current facility-administered medications on file prior to visit.         ALLERGIES:  No Known Allergies     Social History     Socioeconomic History   • Marital status:      Spouse name: Not on file   • Number of children: 1   • Years of education: High school   • Highest education level: Not on file   Occupational History   • Occupation:      Employer: [employer]   Tobacco Use   • Smoking status: Current Every Day Smoker     Packs/day: 1.00      "Years: 45.00     Pack years: 45.00     Types: Cigarettes   • Smokeless tobacco: Never Used   Substance and Sexual Activity   • Alcohol use: Yes     Comment: Occasional   • Drug use: Never   • Sexual activity: Defer        Family History   Problem Relation Age of Onset   • Hypertension Mother    • Thyroid disease Mother    • Depression Mother    • Anxiety disorder Mother    • Diverticulitis Mother    • Heart disease Father    • Hypertension Father    • Diabetes Father    • Kidney disease Father    • Leukemia Sister    • Heart disease Brother    • Hypertension Brother    • Heart disease Maternal Grandmother    • Other Maternal Grandfather         coal mining accident   • Diabetes Paternal Grandmother    • Heart disease Paternal Grandmother    • Heart disease Brother    • Leukemia Niece         Review of Systems   Constitutional: Positive for diaphoresis, fatigue and unexpected weight change (30 pound weight gain). Negative for activity change and fever.   HENT: Positive for congestion. Negative for hearing loss and mouth sores.    Eyes: Positive for visual disturbance. Negative for photophobia.   Respiratory: Positive for cough and shortness of breath. Negative for wheezing.    Cardiovascular: Negative.    Gastrointestinal: Positive for abdominal pain and blood in stool. Negative for constipation and diarrhea.   Genitourinary: Negative.    Musculoskeletal: Positive for back pain. Negative for arthralgias and joint swelling.   Skin: Negative.    Allergic/Immunologic: Negative.    Neurological: Positive for dizziness, numbness and headaches. Negative for weakness.   Hematological: Negative.    Psychiatric/Behavioral: Negative.    ROS unchanged-1/20/2020    Objective     Vitals:    01/20/20 1526   BP: 154/84   Pulse: 82   Resp: 16   Temp: 97.8 °F (36.6 °C)   TempSrc: Oral   SpO2: 95%   Weight: 118 kg (260 lb 12.8 oz)   Height: 162.8 cm (64.09\")   PainSc: 0-No pain     Current Status 1/20/2020   ECOG score 0 "       Physical Exam    CON: pleasant well-developed obese woman  HEENT: no icterus, no thrush, moist membranes  NECK: no jvd  LYMPH: no cervical or supraclavicular lad  CV: RRR, S1S2, no murmur  RESP: cta bilat, no wheezing, no rales  GI: soft, non-tender, no splenomegaly but exam limited by habitus, +bs  MUSC: no edema, normal gait  NEURO: alert and oriented x3, normal strength  PSYCH: normal mood and affect  SKIN: no rash or induration  Exam unchanged-1/20/2020  RECENT LABS:  Hematology WBC   Date Value Ref Range Status   01/10/2020 11.94 (H) 3.40 - 10.80 10*3/mm3 Final     RBC   Date Value Ref Range Status   01/10/2020 4.52 3.77 - 5.28 10*6/mm3 Final     Hemoglobin   Date Value Ref Range Status   01/10/2020 13.6 12.0 - 15.9 g/dL Final     Hematocrit   Date Value Ref Range Status   01/10/2020 42.3 34.0 - 46.6 % Final     Platelets   Date Value Ref Range Status   01/10/2020 271 140 - 450 10*3/mm3 Final        Lab Results   Component Value Date    GLUCOSE 107 01/10/2020    BUN 10 01/10/2020    CREATININE 0.90 01/16/2020    EGFRIFNONA 72 01/10/2020    BCR 12.2 01/10/2020    K 4.4 01/10/2020    CO2 29.0 01/10/2020    CALCIUM 9.4 01/10/2020    ALBUMIN 4.40 01/10/2020    AST 19 01/10/2020    ALT 17 01/10/2020     CT neck, chest, abdomen, pelvis reviewed    Assessment/Plan     1.  Lymphocytosis present for at least 3-year duration  2.  Left upper quadrant abdominal pain, fatigue, feverish  3.  Tobacco abuse    The patient's evaluation was reviewed with her and her son today.  Peripheral blood flow cytometry showed no significant monoclonal B-cell population.  CT scans do not show evidence of lymphadenopathy or splenomegaly.  Her leukocytosis is most likely related to smoking or otherwise inflammatory.  CT did note a oval density in the left hemipelvis possibly a left ovarian cyst.  Radiology recommended 4-month follow-up scan which I ordered.  She also has fatty liver which I discussed the results with the patient.  I  recommended weight loss, control of sugar and cholesterol with her PCP.  She continues to have fatigue and daytime somnolence, so I referred her to pulmonary medicine for sleep evaluation.

## 2020-02-10 ENCOUNTER — TELEPHONE (OUTPATIENT)
Dept: ONCOLOGY | Facility: CLINIC | Age: 60
End: 2020-02-10

## 2020-02-10 NOTE — TELEPHONE ENCOUNTER
D/W Becca, . She just wanted to confirm pt's diagnosis. Had questions about treatment plan. Answered questions and she v/u.

## 2020-04-23 ENCOUNTER — TELEMEDICINE (OUTPATIENT)
Dept: FAMILY MEDICINE CLINIC | Facility: CLINIC | Age: 60
End: 2020-04-23

## 2020-04-23 DIAGNOSIS — Z72.0 TOBACCO ABUSE: ICD-10-CM

## 2020-04-23 DIAGNOSIS — I10 ESSENTIAL HYPERTENSION: ICD-10-CM

## 2020-04-23 DIAGNOSIS — J20.9 ACUTE BRONCHITIS, UNSPECIFIED ORGANISM: ICD-10-CM

## 2020-04-23 DIAGNOSIS — R05.9 COUGH: Primary | ICD-10-CM

## 2020-04-23 RX ORDER — AMLODIPINE BESYLATE 5 MG/1
5 TABLET ORAL DAILY
Qty: 30 TABLET | Refills: 2 | Status: SHIPPED | OUTPATIENT
Start: 2020-04-23 | End: 2020-06-29 | Stop reason: ALTCHOICE

## 2020-04-23 RX ORDER — BENZONATATE 100 MG/1
100 CAPSULE ORAL 3 TIMES DAILY PRN
Qty: 21 CAPSULE | Refills: 0 | Status: SHIPPED | OUTPATIENT
Start: 2020-04-23 | End: 2020-05-22

## 2020-04-23 RX ORDER — AZITHROMYCIN 250 MG/1
TABLET, FILM COATED ORAL
Qty: 6 TABLET | Refills: 0 | Status: SHIPPED | OUTPATIENT
Start: 2020-04-23 | End: 2020-05-22

## 2020-04-23 NOTE — PATIENT INSTRUCTIONS
Start zpack take as directed.   Tessalon perles as needed for cough,   tylenol OTC as needed for aches or fever.   Cont albuterol inhaler as needed for wheezing.   Advised [facility] today for covid testing, advised self quarantine for 14 days, work note given.   Start amlodipine 5mg daily, monitor BP at home call with elevated readings >140/90, SE explained, record readings,   F/u in about 1-2 months for BP recheck.   If any worsening symptoms, fever, SOA advised ER.   Increase fluid intake, get plenty of rest.   Patient agrees with plan of care and understands instructions. Call if worsening symptoms or any problems or concerns.

## 2020-04-23 NOTE — PROGRESS NOTES
Subjective   Lena Kirkpatrick is a 59 y.o. female.     History of Present Illness   You have chosen to receive care through a telehealth visit.  Do you consent to use a video/audio connection for your medical care today? Yes  Time spent during visit about 15 minutes.   C/o cough, states symptoms started about 1 week ago, worsening over the past week. hx of bronchitis, she does have seaosnal allergies. She did have low grade temp 99 last night, does have chills. She has congestion, denies wheezing, denies SOA, does have some body aches, denies sore throat. She does work for [employer], she has been going to work. Denies any known covid 19 exposure. She has been out to grocery in the community. She did try mucinex OTC, also tried aleve. She has increased water intake, she does smoke about 1 ppd for about 45 years.   With HTN, she does check BP at home, states Bp at home 140s/90s prev on lisinopril 10mg daily but had dry cough, states she d/c lisinopril in 12/19, denies CP,SOA, LE edema, states dry cough has resovled when she stopped lisinopril.     The following portions of the patient's history were reviewed and updated as appropriate: allergies, current medications, past family history, past medical history, past social history, past surgical history and problem list.    Review of Systems   Constitutional: Positive for chills and fever. Negative for diaphoresis.   HENT: Positive for congestion. Negative for ear pain, postnasal drip, rhinorrhea, sinus pressure and sore throat.    Respiratory: Positive for cough. Negative for shortness of breath and wheezing.    Cardiovascular: Negative for chest pain, palpitations and leg swelling.   Musculoskeletal: Positive for myalgias. Negative for arthralgias.   Neurological: Negative for dizziness, light-headedness and headache.   All other systems reviewed and are negative.      Objective   Physical Exam   Constitutional: She is oriented to person, place, and time. She  appears well-developed and well-nourished.   HENT:   Head: Normocephalic.   Eyes: Pupils are equal, round, and reactive to light.   Pulmonary/Chest: Effort normal.   Neurological: She is alert and oriented to person, place, and time.   physical exam limited d/t video visit.       Assessment/Plan   Lena was seen today for cough and hypertension.    Diagnoses and all orders for this visit:    Cough  -     QUESTIONNAIRE SERIES    Acute bronchitis, unspecified organism    Tobacco abuse    Essential hypertension    Other orders  -     benzonatate (Tessalon Perles) 100 MG capsule; Take 1 capsule by mouth 3 (Three) Times a Day As Needed for Cough.  -     azithromycin (ZITHROMAX) 250 MG tablet; Take 2 tablets the first day, then 1 tablet daily for 4 days.  -     amLODIPine (NORVASC) 5 MG tablet; Take 1 tablet by mouth Daily.          Start zpack take as directed.   Tessalon perles as needed for cough,   tylenol OTC as needed for aches or fever.   Cont albuterol inhaler as needed for wheezing.   Advised [facility] today for covid testing, advised self quarantine for 14 days, work note given.   Start amlodipine 5mg daily, monitor BP at home call with elevated readings >140/90, SE explained, record readings,   F/u in about 1-2 months for BP recheck.   If any worsening symptoms, fever, SOA advised ER.   Increase fluid intake, get plenty of rest.   Patient agrees with plan of care and understands instructions. Call if worsening symptoms or any problems or concerns.

## 2020-05-08 ENCOUNTER — HOSPITAL ENCOUNTER (OUTPATIENT)
Dept: CT IMAGING | Facility: HOSPITAL | Age: 60
Discharge: HOME | End: 2020-05-08
Admitting: INTERNAL MEDICINE

## 2020-05-08 DIAGNOSIS — N83.202 CYST OF LEFT OVARY: ICD-10-CM

## 2020-05-08 LAB — CREAT BLDA-MCNC: 0.8 MG/DL (ref 0.6–1.3)

## 2020-05-08 PROCEDURE — 0 DIATRIZOATE MEGLUMINE & SODIUM PER 1 ML: Performed by: INTERNAL MEDICINE

## 2020-05-08 PROCEDURE — 25010000002 IOPAMIDOL 61 % SOLUTION: Performed by: INTERNAL MEDICINE

## 2020-05-08 PROCEDURE — 74177 CT ABD & PELVIS W/CONTRAST: CPT

## 2020-05-08 PROCEDURE — 82565 ASSAY OF CREATININE: CPT

## 2020-05-08 RX ADMIN — IOPAMIDOL 85 ML: 612 INJECTION, SOLUTION INTRAVENOUS at 07:36

## 2020-05-08 RX ADMIN — DIATRIZOATE MEGLUMINE AND DIATRIZOATE SODIUM 30 ML: 600; 100 SOLUTION ORAL; RECTAL at 06:37

## 2020-05-22 ENCOUNTER — LAB (OUTPATIENT)
Dept: LAB | Facility: HOSPITAL | Age: 60
End: 2020-05-22

## 2020-05-22 ENCOUNTER — OFFICE VISIT (OUTPATIENT)
Dept: ONCOLOGY | Facility: CLINIC | Age: 60
End: 2020-05-22

## 2020-05-22 VITALS
OXYGEN SATURATION: 95 % | RESPIRATION RATE: 16 BRPM | TEMPERATURE: 97.7 F | SYSTOLIC BLOOD PRESSURE: 137 MMHG | HEIGHT: 64 IN | WEIGHT: 254.5 LBS | DIASTOLIC BLOOD PRESSURE: 70 MMHG | BODY MASS INDEX: 43.45 KG/M2 | HEART RATE: 80 BPM

## 2020-05-22 DIAGNOSIS — D72.820 LYMPHOCYTOSIS: Primary | ICD-10-CM

## 2020-05-22 DIAGNOSIS — N83.202 CYST OF LEFT OVARY: ICD-10-CM

## 2020-05-22 DIAGNOSIS — F41.9 ANXIETY: Primary | ICD-10-CM

## 2020-05-22 LAB
ALBUMIN SERPL-MCNC: 4.2 G/DL (ref 3.5–5.2)
ALBUMIN/GLOB SERPL: 1.3 G/DL (ref 1.1–2.4)
ALP SERPL-CCNC: 72 U/L (ref 38–116)
ALT SERPL W P-5'-P-CCNC: 17 U/L (ref 0–33)
ANION GAP SERPL CALCULATED.3IONS-SCNC: 11.1 MMOL/L (ref 5–15)
AST SERPL-CCNC: 19 U/L (ref 0–32)
BASOPHILS # BLD AUTO: 0.08 10*3/MM3 (ref 0–0.2)
BASOPHILS NFR BLD AUTO: 0.6 % (ref 0–1.5)
BILIRUB SERPL-MCNC: 0.3 MG/DL (ref 0.2–1.2)
BUN BLD-MCNC: 12 MG/DL (ref 6–20)
BUN/CREAT SERPL: 16.7 (ref 7.3–30)
CALCIUM SPEC-SCNC: 9.5 MG/DL (ref 8.5–10.2)
CHLORIDE SERPL-SCNC: 99 MMOL/L (ref 98–107)
CO2 SERPL-SCNC: 26.9 MMOL/L (ref 22–29)
CREAT BLD-MCNC: 0.72 MG/DL (ref 0.6–1.1)
DEPRECATED RDW RBC AUTO: 42.5 FL (ref 37–54)
EOSINOPHIL # BLD AUTO: 0.16 10*3/MM3 (ref 0–0.4)
EOSINOPHIL NFR BLD AUTO: 1.3 % (ref 0.3–6.2)
ERYTHROCYTE [DISTWIDTH] IN BLOOD BY AUTOMATED COUNT: 12.3 % (ref 12.3–15.4)
GFR SERPL CREATININE-BSD FRML MDRD: 83 ML/MIN/1.73
GLOBULIN UR ELPH-MCNC: 3.2 GM/DL (ref 1.8–3.5)
GLUCOSE BLD-MCNC: 106 MG/DL (ref 74–124)
HCT VFR BLD AUTO: 43.3 % (ref 34–46.6)
HGB BLD-MCNC: 13.6 G/DL (ref 12–15.9)
IMM GRANULOCYTES # BLD AUTO: 0.04 10*3/MM3 (ref 0–0.05)
IMM GRANULOCYTES NFR BLD AUTO: 0.3 % (ref 0–0.5)
LYMPHOCYTES # BLD AUTO: 4.7 10*3/MM3 (ref 0.7–3.1)
LYMPHOCYTES NFR BLD AUTO: 37.5 % (ref 19.6–45.3)
MCH RBC QN AUTO: 29.6 PG (ref 26.6–33)
MCHC RBC AUTO-ENTMCNC: 31.4 G/DL (ref 31.5–35.7)
MCV RBC AUTO: 94.1 FL (ref 79–97)
MONOCYTES # BLD AUTO: 0.81 10*3/MM3 (ref 0.1–0.9)
MONOCYTES NFR BLD AUTO: 6.5 % (ref 5–12)
NEUTROPHILS # BLD AUTO: 6.74 10*3/MM3 (ref 1.7–7)
NEUTROPHILS NFR BLD AUTO: 53.8 % (ref 42.7–76)
NRBC BLD AUTO-RTO: 0 /100 WBC (ref 0–0.2)
PLATELET # BLD AUTO: 267 10*3/MM3 (ref 140–450)
PMV BLD AUTO: 9.6 FL (ref 6–12)
POTASSIUM BLD-SCNC: 4.1 MMOL/L (ref 3.5–4.7)
PROT SERPL-MCNC: 7.4 G/DL (ref 6.3–8)
RBC # BLD AUTO: 4.6 10*6/MM3 (ref 3.77–5.28)
SODIUM BLD-SCNC: 137 MMOL/L (ref 134–145)
WBC NRBC COR # BLD: 12.53 10*3/MM3 (ref 3.4–10.8)

## 2020-05-22 PROCEDURE — 36415 COLL VENOUS BLD VENIPUNCTURE: CPT

## 2020-05-22 PROCEDURE — 85025 COMPLETE CBC W/AUTO DIFF WBC: CPT

## 2020-05-22 PROCEDURE — 80053 COMPREHEN METABOLIC PANEL: CPT

## 2020-05-22 PROCEDURE — 99213 OFFICE O/P EST LOW 20 MIN: CPT | Performed by: INTERNAL MEDICINE

## 2020-05-22 NOTE — PROGRESS NOTES
Subjective     REASON FOR CONSULTATION: Lymphocytosis  Provide an opinion on any further workup or treatment                             REQUESTING PRACTITIONER:  Sharron Lane    RECORDS OBTAINED:  Records of the patients history including those obtained from the referring provider were reviewed and summarized in detail.    History of Present Illness   This is a pleasant 59-year-old woman seen today for evaluation of an elevated white blood cell count.  Reviewing the patient's records, she has had an elevated white blood cell count to a mild degree since at least 2013.  A CBC on care everywhere 2/16/2013 showed a white blood cell count 14.5 with elevated neutrophils at that time.  More recent CBCs in the [facility] system dating to August 2016 show a white blood cell count ranging between 9.9 and 12.6.  Most of her CBC showed a mild lymphocytosis with an absolute lymphocyte count typically around 4000.  The patient has complaints of 30 pound weight gain over the past 1 year, fatigue, hypersomnolence, she oftentimes feels feverish without elevated temperature.  She has occasional mild night sweats mostly dampness on the back of the neck but no drenching full body sweats.  She does complain of dull pain or increased fullness sensation in the left upper quadrant laterally which is been intermittently present for approximately 5 years.  She states she was treated for H. pylori about 5 years ago with temporary relief of the symptoms but symptoms have now recurred.  She denies bright red blood per rectum or melanotic stool.  She does smoke tobacco 1 pack of cigarettes per day for 45 years.  She has not been undergoing CT lung cancer screening.  She has a familial history of leukemias, unknown subtypes.    When seen on 1/10/2028 peripheral blood flow cytometry was performed which showed no monoclonal B-cell populations.  CT of the neck, chest, abdomen and pelvis showed no significant lymphadenopathy or splenomegaly.   There was fatty infiltration of the liver.  There was a low-density mass in the left hemo-pelvis possibly representing an ovarian cyst, hydrosalpinx, less likely lymphadenopathy.    The patient returned today for follow-up.  She is doing well with no unusual weight loss or lymphadenopathy.  She denies fevers or chills.  She is under personal stress after her father has  and helping with her mother's care.    Past Medical History:   Diagnosis Date   • Abdominal pain    • Anxiety    • Back pain    • Cough    • CTS (carpal tunnel syndrome)    • Depression    • Dizziness    • Fatigue     excessive   • Heat intolerance    • Hemorrhoids    • History of colon polyps    • Hypertension    • Numbness    • Ocular migraine    • Palpitation    • Poor vision    • Sexually transmitted disease     venereal disease   • Shortness of breath    • Soreness of tongue    • Weight gain         Past Surgical History:   Procedure Laterality Date   • COLONOSCOPY N/A 10/19/2017    Procedure: COLONOSCOPY TO CECUM WITH POLYPECTOMY ( HOT SNARE;  Surgeon: Rosi Cohen MD;  Location: [facility];  Service:    • COLONOSCOPY     • HYSTERECTOMY      Partial   • SUBTOTAL HYSTERECTOMY  2013        Current Outpatient Medications on File Prior to Visit   Medication Sig Dispense Refill   • albuterol sulfate  (90 Base) MCG/ACT inhaler Inhale 2 puffs Every 4 (Four) Hours As Needed for Wheezing or Shortness of Air. 1 inhaler 11   • amLODIPine (NORVASC) 5 MG tablet Take 1 tablet by mouth Daily. 30 tablet 2   • estradiol (ESTRACE) 0.1 MG/GM vaginal cream Insert 2 g into the vagina Daily.     • sertraline (ZOLOFT) 50 MG tablet Take 50 mg by mouth Daily.     • [DISCONTINUED] azithromycin (ZITHROMAX) 250 MG tablet Take 2 tablets the first day, then 1 tablet daily for 4 days. 6 tablet 0   • [DISCONTINUED] benzonatate (Tessalon Perles) 100 MG capsule Take 1 capsule by mouth 3 (Three) Times a Day As Needed for Cough. 21 capsule 0     No  current facility-administered medications on file prior to visit.         ALLERGIES:  No Known Allergies     Social History     Socioeconomic History   • Marital status:      Spouse name: Not on file   • Number of children: 1   • Years of education: High school   • Highest education level: Not on file   Occupational History   • Occupation:      Employer: [employer]   Tobacco Use   • Smoking status: Current Every Day Smoker     Packs/day: 1.00     Years: 45.00     Pack years: 45.00     Types: Cigarettes   • Smokeless tobacco: Never Used   Substance and Sexual Activity   • Alcohol use: Yes     Comment: Occasional   • Drug use: Never   • Sexual activity: Defer        Family History   Problem Relation Age of Onset   • Hypertension Mother    • Thyroid disease Mother    • Depression Mother    • Anxiety disorder Mother    • Diverticulitis Mother    • Heart disease Father    • Hypertension Father    • Diabetes Father    • Kidney disease Father    • Leukemia Sister    • Heart disease Brother    • Hypertension Brother    • Heart disease Maternal Grandmother    • Other Maternal Grandfather         coal mining accident   • Diabetes Paternal Grandmother    • Heart disease Paternal Grandmother    • Heart disease Brother    • Leukemia Niece         Review of Systems   Constitutional: Positive for fatigue. Negative for activity change, diaphoresis, fever and unexpected weight change.   HENT: Negative for congestion, hearing loss and mouth sores.    Eyes: Positive for visual disturbance. Negative for photophobia.   Respiratory: Positive for cough and shortness of breath. Negative for wheezing.    Cardiovascular: Negative.    Gastrointestinal: Negative for abdominal pain, blood in stool, constipation and diarrhea.   Genitourinary: Negative.    Musculoskeletal: Positive for back pain. Negative for arthralgias and joint swelling.   Skin: Negative.    Allergic/Immunologic: Negative.    Neurological: Positive  "for numbness and headaches. Negative for dizziness and weakness.   Hematological: Negative.    Psychiatric/Behavioral: Negative.  Hallucinations:        Objective     Vitals:    05/22/20 1128   BP: 137/70   Pulse: 80   Resp: 16   Temp: 97.7 °F (36.5 °C)   SpO2: 95%   Weight: 115 kg (254 lb 8 oz)   Height: 162.8 cm (64.09\")   PainSc: 0-No pain     Current Status 5/22/2020   ECOG score 0       Physical Exam    CON: pleasant well-developed obese woman  HEENT: no icterus, no thrush, moist membranes  NECK: no jvd  LYMPH: no cervical or supraclavicular lad  CV: RRR, S1S2, no murmur  RESP: cta bilat, no wheezing, no rales  GI: soft, non-tender, no splenomegaly but exam limited by habitus, +bs  MUSC: no edema, normal gait  NEURO: alert and oriented x3, normal strength  PSYCH: normal mood and affect  SKIN: no rash or induration  Exam unchanged- 5/22/2020  RECENT LABS:  Hematology WBC   Date Value Ref Range Status   05/22/2020 12.53 (H) 3.40 - 10.80 10*3/mm3 Final     RBC   Date Value Ref Range Status   05/22/2020 4.60 3.77 - 5.28 10*6/mm3 Final     Hemoglobin   Date Value Ref Range Status   05/22/2020 13.6 12.0 - 15.9 g/dL Final     Hematocrit   Date Value Ref Range Status   05/22/2020 43.3 34.0 - 46.6 % Final     Platelets   Date Value Ref Range Status   05/22/2020 267 140 - 450 10*3/mm3 Final        Lab Results   Component Value Date    GLUCOSE 107 01/10/2020    BUN 10 01/10/2020    CREATININE 0.80 05/08/2020    EGFRIFNONA 72 01/10/2020    BCR 12.2 01/10/2020    K 4.4 01/10/2020    CO2 29.0 01/10/2020    CALCIUM 9.4 01/10/2020    ALBUMIN 4.40 01/10/2020    AST 19 01/10/2020    ALT 17 01/10/2020     CT abdomen/pelvis 5/8/2020 shows the tubular cystic structure in the left pelvis to have diminished in size since the previous scan supportive of a benign etiology.  Atherosclerotic buildup in the aorta    Assessment/Plan     1.  Lymphocytosis present for at least 3-year duration, negative peripheral blood flow cytometry for a " lymphoproliferative disorder, likely reactive process  2.  Tubular cystic structure left pelvis: Follow-up CT today consistent with benign etiology  3.  Fatty liver, atherosclerotic buildup aorta discussed with patient- defer follow-up to PCP with respect to risk factor reduction and further evaluation as needed    I will defer the patient's care back to her primary care provider at this time.  I would recommend at least a yearly CBC with differential.  I would be happy to see the patient back for reevaluation if there is a change in her blood counts.

## 2020-06-29 ENCOUNTER — OFFICE VISIT (OUTPATIENT)
Dept: FAMILY MEDICINE CLINIC | Facility: CLINIC | Age: 60
End: 2020-06-29

## 2020-06-29 VITALS
BODY MASS INDEX: 44.12 KG/M2 | OXYGEN SATURATION: 98 % | HEART RATE: 84 BPM | HEIGHT: 64 IN | WEIGHT: 258.4 LBS | TEMPERATURE: 97.5 F | SYSTOLIC BLOOD PRESSURE: 120 MMHG | DIASTOLIC BLOOD PRESSURE: 82 MMHG

## 2020-06-29 DIAGNOSIS — R73.03 PREDIABETES: ICD-10-CM

## 2020-06-29 DIAGNOSIS — I70.0 ATHEROSCLEROSIS OF AORTA (HCC): ICD-10-CM

## 2020-06-29 DIAGNOSIS — Z72.0 TOBACCO ABUSE: ICD-10-CM

## 2020-06-29 DIAGNOSIS — I10 ESSENTIAL HYPERTENSION: Primary | ICD-10-CM

## 2020-06-29 DIAGNOSIS — G56.22 IMPINGEMENT OF LEFT ULNAR NERVE: ICD-10-CM

## 2020-06-29 DIAGNOSIS — D72.829 LEUKOCYTOSIS, UNSPECIFIED TYPE: ICD-10-CM

## 2020-06-29 DIAGNOSIS — E66.01 CLASS 3 SEVERE OBESITY DUE TO EXCESS CALORIES WITH SERIOUS COMORBIDITY AND BODY MASS INDEX (BMI) OF 40.0 TO 44.9 IN ADULT (HCC): ICD-10-CM

## 2020-06-29 DIAGNOSIS — K76.0 FATTY LIVER: ICD-10-CM

## 2020-06-29 LAB
ALBUMIN SERPL-MCNC: 4.2 G/DL (ref 3.5–5.2)
ALBUMIN/GLOB SERPL: 1.4 G/DL
ALP SERPL-CCNC: 69 U/L (ref 39–117)
ALT SERPL W P-5'-P-CCNC: 18 U/L (ref 1–33)
ANION GAP SERPL CALCULATED.3IONS-SCNC: 8.5 MMOL/L (ref 5–15)
AST SERPL-CCNC: 15 U/L (ref 1–32)
BACTERIA UR QL AUTO: NORMAL /HPF
BILIRUB SERPL-MCNC: 0.2 MG/DL (ref 0.2–1.2)
BILIRUB UR QL STRIP: NEGATIVE
BUN BLD-MCNC: 13 MG/DL (ref 6–20)
BUN/CREAT SERPL: 19.4 (ref 7–25)
CALCIUM SPEC-SCNC: 9 MG/DL (ref 8.6–10.5)
CHLORIDE SERPL-SCNC: 103 MMOL/L (ref 98–107)
CHOLEST SERPL-MCNC: 168 MG/DL (ref 0–200)
CLARITY UR: CLEAR
CO2 SERPL-SCNC: 26.5 MMOL/L (ref 22–29)
COLOR UR: YELLOW
CREAT BLD-MCNC: 0.67 MG/DL (ref 0.57–1)
ERYTHROCYTE [DISTWIDTH] IN BLOOD BY AUTOMATED COUNT: 13.2 % (ref 12.3–15.4)
GFR SERPL CREATININE-BSD FRML MDRD: 90 ML/MIN/1.73
GLOBULIN UR ELPH-MCNC: 3 GM/DL
GLUCOSE BLD-MCNC: 107 MG/DL (ref 65–99)
GLUCOSE UR STRIP-MCNC: NEGATIVE MG/DL
HBA1C MFR BLD: 6 % (ref 4.8–5.6)
HCT VFR BLD AUTO: 42.2 % (ref 34–46.6)
HDLC SERPL-MCNC: 62 MG/DL (ref 40–60)
HGB BLD-MCNC: 13.2 G/DL (ref 12–15.9)
HGB UR QL STRIP.AUTO: NEGATIVE
KETONES UR QL STRIP: NEGATIVE
LDLC SERPL CALC-MCNC: 86 MG/DL (ref 0–100)
LDLC/HDLC SERPL: 1.39 {RATIO}
LEUKOCYTE ESTERASE UR QL STRIP.AUTO: NEGATIVE
LYMPHOCYTES # BLD AUTO: 3.2 10*3/MM3 (ref 0.7–3.1)
LYMPHOCYTES NFR BLD AUTO: 29.5 % (ref 19.6–45.3)
MCH RBC QN AUTO: 28.2 PG (ref 26.6–33)
MCHC RBC AUTO-ENTMCNC: 31.2 G/DL (ref 31.5–35.7)
MCV RBC AUTO: 90.5 FL (ref 79–97)
MONOCYTES # BLD AUTO: 0.3 10*3/MM3 (ref 0.1–0.9)
MONOCYTES NFR BLD AUTO: 3 % (ref 5–12)
NEUTROPHILS # BLD AUTO: 7.2 10*3/MM3 (ref 1.7–7)
NEUTROPHILS NFR BLD AUTO: 67.5 % (ref 42.7–76)
NITRITE UR QL STRIP: NEGATIVE
PH UR STRIP.AUTO: 6.5 [PH] (ref 4.6–8)
PLATELET # BLD AUTO: 304 10*3/MM3 (ref 140–450)
PMV BLD AUTO: 7.4 FL (ref 6–12)
POTASSIUM BLD-SCNC: 4.4 MMOL/L (ref 3.5–5.2)
PROT SERPL-MCNC: 7.2 G/DL (ref 6–8.5)
PROT UR QL STRIP: NEGATIVE
RBC # BLD AUTO: 4.67 10*6/MM3 (ref 3.77–5.28)
RBC # UR: NORMAL /HPF
REF LAB TEST METHOD: NORMAL
SODIUM BLD-SCNC: 138 MMOL/L (ref 136–145)
SP GR UR STRIP: 1.02 (ref 1–1.03)
SQUAMOUS #/AREA URNS HPF: NORMAL /HPF
TRIGL SERPL-MCNC: 99 MG/DL (ref 0–150)
TSH SERPL DL<=0.05 MIU/L-ACNC: 3.48 UIU/ML (ref 0.27–4.2)
UROBILINOGEN UR QL STRIP: NORMAL
VLDLC SERPL-MCNC: 19.8 MG/DL
WBC NRBC COR # BLD: 10.7 10*3/MM3 (ref 3.4–10.8)
WBC UR QL AUTO: NORMAL /HPF

## 2020-06-29 PROCEDURE — 81001 URINALYSIS AUTO W/SCOPE: CPT | Performed by: NURSE PRACTITIONER

## 2020-06-29 PROCEDURE — 36415 COLL VENOUS BLD VENIPUNCTURE: CPT | Performed by: NURSE PRACTITIONER

## 2020-06-29 PROCEDURE — 80053 COMPREHEN METABOLIC PANEL: CPT | Performed by: NURSE PRACTITIONER

## 2020-06-29 PROCEDURE — 84443 ASSAY THYROID STIM HORMONE: CPT | Performed by: NURSE PRACTITIONER

## 2020-06-29 PROCEDURE — 85025 COMPLETE CBC W/AUTO DIFF WBC: CPT | Performed by: NURSE PRACTITIONER

## 2020-06-29 PROCEDURE — 80061 LIPID PANEL: CPT | Performed by: NURSE PRACTITIONER

## 2020-06-29 PROCEDURE — 83036 HEMOGLOBIN GLYCOSYLATED A1C: CPT | Performed by: NURSE PRACTITIONER

## 2020-06-29 PROCEDURE — 99214 OFFICE O/P EST MOD 30 MIN: CPT | Performed by: NURSE PRACTITIONER

## 2020-06-29 RX ORDER — METOPROLOL SUCCINATE 25 MG/1
25 TABLET, EXTENDED RELEASE ORAL DAILY
Qty: 30 TABLET | Refills: 1 | Status: SHIPPED | OUTPATIENT
Start: 2020-06-29 | End: 2020-08-24 | Stop reason: DRUGHIGH

## 2020-06-29 RX ORDER — ESTRADIOL 0.03 MG/D
1 FILM, EXTENDED RELEASE TRANSDERMAL 2 TIMES WEEKLY
COMMUNITY
End: 2021-09-02

## 2020-06-29 RX ORDER — BACLOFEN 10 MG/1
10 TABLET ORAL NIGHTLY
Qty: 30 TABLET | Refills: 1 | Status: SHIPPED | OUTPATIENT
Start: 2020-06-29

## 2020-06-29 NOTE — PATIENT INSTRUCTIONS
check labs and call with results, stop amlodipine and trial metoprolol XL 25 mg daily and check BP at home, call with log in 2 wks, Enc healthy diet and regular exercise for wt loss and BP control, encourage smoking cessation instructed patient to stop buying cigarettes and wean down, consider starting lipitor 10 mg pending lab results, trial baclofen 10 mg HS and monitor, consider PT or neuro consult prn, defer steroids d/t prediabetes

## 2020-06-29 NOTE — PROGRESS NOTES
Subjective   Lena Kirkpatrick is a 59 y.o. female.     History of Present Illness   Here to FU on chronic HTN recently started on amlodipine 5 mg daily since 04/20, prev on lisinopril 10 mg then decreased to 5 mg and stopped 12/19 d/t cough, no CP dizziness HA but LE edema, checks BP at home 120-140/70-90s, With weight gain and last A1C 6 12/19 prediabetic admits no regular exercise or healthy diet, with chronic depression controlled [facility], stopped lexapro 20 mg daily and wellbutrin  mg BID, on zoloft 50 mg trying to get motivated to eat healthy and start exercising, fam hx mom and dad and brother and grandparents heart disease  still smoking 1 ppd but doesn't finish entire cigarette and would like to quit smoking, prev tried chantix and didn't like the way it made her feel, last CXR 11/16 NAD, with chronic cough was seen by [facility] 05/17 and 07/17 and tx with some improvement doesn't use albuterol regularly request refill, tried patches during illness but burned skin and stopped, wellbutrin no help to quit  Saw Dr Bryant hematology for chronically elevated WBC attributes to smoking, fatty liver, cyst on ovary benign, noticed atherosclerosis on aorta with last chol 12/19 LDL 75, no prev chol med  Hx of L ulnar impingement and saw neurology in past tx baclofen and steroids, now with worsening pain and numbness medial arm to last 2 digits, no recent injury trauma or trigger event, doesn't want to see neuro again at this time if possible    The following portions of the patient's history were reviewed and updated as appropriate: allergies, current medications, past family history, past medical history, past social history, past surgical history and problem list.    Review of Systems   Constitutional: Negative for fever.   Respiratory: Negative for cough, shortness of breath and wheezing.    Cardiovascular: Positive for leg swelling. Negative for chest pain and palpitations.    Gastrointestinal: Negative for abdominal distention, abdominal pain, anal bleeding, blood in stool, constipation, diarrhea, nausea, rectal pain and vomiting.   Endocrine: Negative for cold intolerance, heat intolerance, polydipsia, polyphagia and polyuria.   Neurological: Negative for dizziness and headaches.   Psychiatric/Behavioral: Positive for dysphoric mood (well controlled). Negative for agitation, behavioral problems, confusion, decreased concentration, hallucinations, self-injury, sleep disturbance and suicidal ideas. The patient is not nervous/anxious and is not hyperactive.    All other systems reviewed and are negative.      Objective   Physical Exam   Constitutional: She is oriented to person, place, and time. She appears well-developed and well-nourished.   HENT:   Head: Normocephalic and atraumatic.   Eyes: Pupils are equal, round, and reactive to light. Conjunctivae and EOM are normal.   Cardiovascular: Normal rate, regular rhythm and normal heart sounds.   Pulmonary/Chest: Effort normal and breath sounds normal.   Musculoskeletal: Normal range of motion. She exhibits edema (B LE nonpitting) and tenderness (left arm medial to 4th and 5th digit numnbess, ROM elbow and fingers).   Neurological: She is alert and oriented to person, place, and time.   Skin: Skin is warm and dry.   Psychiatric: She has a normal mood and affect. Her behavior is normal. Judgment and thought content normal.   Vitals reviewed.      Assessment/Plan   Lena was seen today for hypertension.    Diagnoses and all orders for this visit:    Essential hypertension  -     CBC & Differential  -     Comprehensive Metabolic Panel  -     Lipid Panel  -     TSH  -     Urinalysis With Microscopic - Urine, Clean Catch  -     CBC Auto Differential  -     Urinalysis without microscopic (no culture) - Urine, Clean Catch  -     Urinalysis, Microscopic Only - Urine, Clean Catch    Tobacco abuse    Class 3 severe obesity due to excess calories  with serious comorbidity and body mass index (BMI) of 40.0 to 44.9 in adult (CMS/[facility])    Prediabetes  -     Hemoglobin A1c    Atherosclerosis of aorta (CMS/[facility])  -     Comprehensive Metabolic Panel  -     Lipid Panel    Fatty liver  -     Comprehensive Metabolic Panel    Leukocytosis, unspecified type  -     CBC & Differential  -     CBC Auto Differential    Impingement of left ulnar nerve    Other orders  -     metoprolol succinate XL (Toprol XL) 25 MG 24 hr tablet; Take 1 tablet by mouth Daily.  -     baclofen (LIORESAL) 10 MG tablet; Take 1 tablet by mouth Every Night.    check labs and call with results, stop amlodipine and trial metoprolol XL 25 mg daily and check BP at home, call with log in 2 wks, Enc healthy diet and regular exercise for wt loss and BP control, encourage smoking cessation instructed patient to stop buying cigarettes and wean down, consider starting lipitor 10 mg pending lab results, trial baclofen 10 mg HS and monitor, consider PT or neuro consult prn, defer steroids d/t prediabetes

## 2020-08-24 RX ORDER — METOPROLOL SUCCINATE 50 MG/1
50 TABLET, EXTENDED RELEASE ORAL DAILY
Qty: 30 TABLET | Refills: 2 | Status: SHIPPED | OUTPATIENT
Start: 2020-08-24 | End: 2021-09-02

## 2020-09-14 RX ORDER — METOPROLOL SUCCINATE 25 MG/1
TABLET, EXTENDED RELEASE ORAL
Qty: 30 TABLET | Refills: 11 | Status: SHIPPED | OUTPATIENT
Start: 2020-09-14 | End: 2021-09-02 | Stop reason: SDUPTHER

## 2020-09-22 ENCOUNTER — TRANSCRIBE ORDERS (OUTPATIENT)
Dept: ADMINISTRATIVE | Facility: HOSPITAL | Age: 60
End: 2020-09-22

## 2020-09-22 DIAGNOSIS — Z12.31 VISIT FOR SCREENING MAMMOGRAM: Primary | ICD-10-CM

## 2020-11-06 ENCOUNTER — HOSPITAL ENCOUNTER (OUTPATIENT)
Dept: MAMMOGRAPHY | Facility: HOSPITAL | Age: 60
Discharge: HOME | End: 2020-11-06
Admitting: FAMILY MEDICINE

## 2020-11-06 DIAGNOSIS — Z12.31 VISIT FOR SCREENING MAMMOGRAM: ICD-10-CM

## 2020-11-06 PROCEDURE — 77063 BREAST TOMOSYNTHESIS BI: CPT

## 2020-11-06 PROCEDURE — 77067 SCR MAMMO BI INCL CAD: CPT

## 2021-03-24 ENCOUNTER — BULK ORDERING (OUTPATIENT)
Dept: CASE MANAGEMENT | Facility: OTHER | Age: 61
End: 2021-03-24

## 2021-03-24 DIAGNOSIS — Z23 IMMUNIZATION DUE: ICD-10-CM

## 2021-09-02 ENCOUNTER — OFFICE VISIT (OUTPATIENT)
Dept: FAMILY MEDICINE CLINIC | Facility: CLINIC | Age: 61
End: 2021-09-02

## 2021-09-02 VITALS
HEART RATE: 87 BPM | OXYGEN SATURATION: 97 % | DIASTOLIC BLOOD PRESSURE: 74 MMHG | TEMPERATURE: 97.3 F | WEIGHT: 234 LBS | HEIGHT: 64 IN | SYSTOLIC BLOOD PRESSURE: 120 MMHG | BODY MASS INDEX: 39.95 KG/M2

## 2021-09-02 DIAGNOSIS — E66.01 CLASS 3 SEVERE OBESITY DUE TO EXCESS CALORIES WITH SERIOUS COMORBIDITY AND BODY MASS INDEX (BMI) OF 40.0 TO 44.9 IN ADULT (HCC): ICD-10-CM

## 2021-09-02 DIAGNOSIS — I10 ESSENTIAL HYPERTENSION: Primary | ICD-10-CM

## 2021-09-02 DIAGNOSIS — F33.1 MODERATE EPISODE OF RECURRENT MAJOR DEPRESSIVE DISORDER (HCC): ICD-10-CM

## 2021-09-02 DIAGNOSIS — Z72.0 TOBACCO ABUSE: ICD-10-CM

## 2021-09-02 DIAGNOSIS — R73.03 PREDIABETES: ICD-10-CM

## 2021-09-02 LAB
ALBUMIN SERPL-MCNC: 4.4 G/DL (ref 3.5–5.2)
ALBUMIN/GLOB SERPL: 2.4 G/DL
ALP SERPL-CCNC: 72 U/L (ref 39–117)
ALT SERPL W P-5'-P-CCNC: 14 U/L (ref 1–33)
ANION GAP SERPL CALCULATED.3IONS-SCNC: 9.9 MMOL/L (ref 5–15)
AST SERPL-CCNC: 11 U/L (ref 1–32)
BACTERIA UR QL AUTO: NORMAL /HPF
BILIRUB SERPL-MCNC: 0.4 MG/DL (ref 0–1.2)
BILIRUB UR QL STRIP: NEGATIVE
BUN SERPL-MCNC: 14 MG/DL (ref 6–20)
BUN/CREAT SERPL: 17.1 (ref 7–25)
CALCIUM SPEC-SCNC: 9.9 MG/DL (ref 8.6–10.5)
CHLORIDE SERPL-SCNC: 103 MMOL/L (ref 98–107)
CHOLEST SERPL-MCNC: 167 MG/DL (ref 0–200)
CLARITY UR: CLEAR
CO2 SERPL-SCNC: 27.1 MMOL/L (ref 22–29)
COLOR UR: YELLOW
CREAT SERPL-MCNC: 0.82 MG/DL (ref 0.57–1)
ERYTHROCYTE [DISTWIDTH] IN BLOOD BY AUTOMATED COUNT: 12.9 % (ref 12.3–15.4)
GFR SERPL CREATININE-BSD FRML MDRD: 71 ML/MIN/1.73
GLOBULIN UR ELPH-MCNC: 1.8 GM/DL
GLUCOSE SERPL-MCNC: 104 MG/DL (ref 65–99)
GLUCOSE UR STRIP-MCNC: NEGATIVE MG/DL
HBA1C MFR BLD: 5.97 % (ref 4.8–5.6)
HCT VFR BLD AUTO: 41.9 % (ref 34–46.6)
HDLC SERPL-MCNC: 59 MG/DL (ref 40–60)
HGB BLD-MCNC: 14.3 G/DL (ref 12–15.9)
HGB UR QL STRIP.AUTO: NEGATIVE
KETONES UR QL STRIP: NEGATIVE
LDLC SERPL CALC-MCNC: 86 MG/DL (ref 0–100)
LDLC/HDLC SERPL: 1.4 {RATIO}
LEUKOCYTE ESTERASE UR QL STRIP.AUTO: NEGATIVE
LYMPHOCYTES # BLD AUTO: 4.4 10*3/MM3 (ref 0.7–3.1)
LYMPHOCYTES NFR BLD AUTO: 38.4 % (ref 19.6–45.3)
MCH RBC QN AUTO: 30 PG (ref 26.6–33)
MCHC RBC AUTO-ENTMCNC: 34.1 G/DL (ref 31.5–35.7)
MCV RBC AUTO: 88 FL (ref 79–97)
MONOCYTES # BLD AUTO: 0.8 10*3/MM3 (ref 0.1–0.9)
MONOCYTES NFR BLD AUTO: 7.1 % (ref 5–12)
NEUTROPHILS NFR BLD AUTO: 54.5 % (ref 42.7–76)
NEUTROPHILS NFR BLD AUTO: 6.2 10*3/MM3 (ref 1.7–7)
NITRITE UR QL STRIP: NEGATIVE
PH UR STRIP.AUTO: 7 [PH] (ref 4.6–8)
PLATELET # BLD AUTO: 312 10*3/MM3 (ref 140–450)
PMV BLD AUTO: 7.3 FL (ref 6–12)
POTASSIUM SERPL-SCNC: 4.3 MMOL/L (ref 3.5–5.2)
PROT SERPL-MCNC: 6.2 G/DL (ref 6–8.5)
PROT UR QL STRIP: NEGATIVE
RBC # BLD AUTO: 4.76 10*6/MM3 (ref 3.77–5.28)
RBC # UR: NORMAL /HPF
REF LAB TEST METHOD: NORMAL
SODIUM SERPL-SCNC: 140 MMOL/L (ref 136–145)
SP GR UR STRIP: 1.02 (ref 1–1.03)
SQUAMOUS #/AREA URNS HPF: NORMAL /HPF
TRIGL SERPL-MCNC: 126 MG/DL (ref 0–150)
TSH SERPL DL<=0.05 MIU/L-ACNC: 2.65 UIU/ML (ref 0.27–4.2)
UROBILINOGEN UR QL STRIP: NORMAL
VLDLC SERPL-MCNC: 22 MG/DL (ref 5–40)
WBC # BLD AUTO: 11.4 10*3/MM3 (ref 3.4–10.8)
WBC UR QL AUTO: NORMAL /HPF

## 2021-09-02 PROCEDURE — 36415 COLL VENOUS BLD VENIPUNCTURE: CPT | Performed by: NURSE PRACTITIONER

## 2021-09-02 PROCEDURE — 99214 OFFICE O/P EST MOD 30 MIN: CPT | Performed by: NURSE PRACTITIONER

## 2021-09-02 PROCEDURE — 85025 COMPLETE CBC W/AUTO DIFF WBC: CPT | Performed by: NURSE PRACTITIONER

## 2021-09-02 PROCEDURE — 84443 ASSAY THYROID STIM HORMONE: CPT | Performed by: NURSE PRACTITIONER

## 2021-09-02 PROCEDURE — 80061 LIPID PANEL: CPT | Performed by: NURSE PRACTITIONER

## 2021-09-02 PROCEDURE — 81001 URINALYSIS AUTO W/SCOPE: CPT | Performed by: NURSE PRACTITIONER

## 2021-09-02 PROCEDURE — 80053 COMPREHEN METABOLIC PANEL: CPT | Performed by: NURSE PRACTITIONER

## 2021-09-02 PROCEDURE — 83036 HEMOGLOBIN GLYCOSYLATED A1C: CPT | Performed by: NURSE PRACTITIONER

## 2021-09-02 RX ORDER — METOPROLOL SUCCINATE 25 MG/1
25 TABLET, EXTENDED RELEASE ORAL DAILY
Qty: 90 TABLET | Refills: 3 | Status: SHIPPED | OUTPATIENT
Start: 2021-09-02

## 2021-09-02 NOTE — PROGRESS NOTES
"Chief Complaint  Hypertension and Depression    Subjective          Lena Kirkpatrick presents to [facility]  History of Present Illness  Here to FU on chronic HTN well controlled on metoprolol Xl 25 mg, prev on lisinopril SE cough, no CP dizziness HA but LE edema, no regular BP checks at home, with chronic prediabetes last A1C 6 06/20 checking BS at home 100-110s working on healthy diet and regular exercise weight loss 25 lbs since last year, with chronic depression controlled on zoloft 50 mg no longer seeing counseling, still smoking 1 ppd but doesn't finish entire cigarette, prev tried chantix and didn't like the way it made her feel, last CT chest and abd 01/20 from Dr Bryant hematology for chronically elevated WBC attributes to smoking, fatty liver, cyst on ovary benign, noticed atherosclerosis on aorta with last chol 12/19 LDL 75, no prev chol med  Sees GYN UTD mammo    Objective   Vital Signs:   /74 (BP Location: Left arm, Patient Position: Sitting, Cuff Size: Large Adult)   Pulse 87   Temp 97.3 °F (36.3 °C) (Temporal)   Ht 162.6 cm (64\")   Wt 106 kg (234 lb)   SpO2 97%   BMI 40.17 kg/m²     Physical Exam  Vitals reviewed.   Constitutional:       Appearance: She is well-developed.   HENT:      Head: Normocephalic and atraumatic.   Eyes:      Conjunctiva/sclera: Conjunctivae normal.      Pupils: Pupils are equal, round, and reactive to light.   Cardiovascular:      Rate and Rhythm: Normal rate and regular rhythm.      Pulses: Normal pulses.      Heart sounds: Normal heart sounds.   Pulmonary:      Effort: Pulmonary effort is normal.      Breath sounds: Normal breath sounds.   Abdominal:      General: There is no distension.      Palpations: Abdomen is soft. There is no mass.      Tenderness: There is no abdominal tenderness. There is no right CVA tenderness, left CVA tenderness, guarding or rebound.      Hernia: No hernia is present.   Musculoskeletal:         General: " Normal range of motion.      Cervical back: Normal range of motion.      Right lower leg: No edema.      Left lower leg: No edema.   Skin:     General: Skin is warm and dry.   Neurological:      Mental Status: She is alert and oriented to person, place, and time.   Psychiatric:         Mood and Affect: Mood normal.         Behavior: Behavior normal.         Thought Content: Thought content normal.         Judgment: Judgment normal.        Result Review :                 Assessment and Plan    Diagnoses and all orders for this visit:    1. Essential hypertension (Primary)  -     CBC & Differential  -     Comprehensive Metabolic Panel  -     Lipid Panel  -     Urinalysis With Microscopic - Urine, Clean Catch  -     TSH    2. Prediabetes  -     Comprehensive Metabolic Panel  -     Hemoglobin A1c    3. Class 3 severe obesity due to excess calories with serious comorbidity and body mass index (BMI) of 40.0 to 44.9 in adult (CMS/[facility])    4. Moderate episode of recurrent major depressive disorder (CMS/[facility])    5. Tobacco abuse    Other orders  -     metoprolol succinate XL (TOPROL-XL) 25 MG 24 hr tablet; Take 1 tablet by mouth Daily.  Dispense: 90 tablet; Refill: 3  -     sertraline (ZOLOFT) 50 MG tablet; Take 1 tablet by mouth Daily.  Dispense: 90 tablet; Refill: 3        Follow Up   No follow-ups on file.  Patient was given instructions and counseling regarding her condition or for health maintenance advice. Please see specific information pulled into the AVS if appropriate.   Refill chronic dz meds, check BP and BS at home and call with results, congratulated on weight loss continue healthy diet and exercise, encourage smoking cessation

## 2021-09-02 NOTE — PATIENT INSTRUCTIONS
Refill chronic dz meds, check BP and BS at home and call with results, congratulated on weight loss continue healthy diet and exercise, encourage smoking cessation

## 2022-10-10 ENCOUNTER — TELEPHONE (OUTPATIENT)
Dept: SURGERY | Facility: CLINIC | Age: 62
End: 2022-10-10

## 2022-10-10 NOTE — TELEPHONE ENCOUNTER
[facility] staff attempted to follow warm transfer process and was unsuccessful     Caller: BRISEYDA LORENZO    Relationship to patient: SELF    Best call back number: [phone]    Patient is needing: SCHEDULE FROM 5YR RECALL. PT'S ADDRESS IS VERIFIED. INFORMED HER WE WILL MAIL A PACKET AND ONCE RETURNED WE WILL CONTACT HER TO SCHEDULE.

## 2022-10-25 ENCOUNTER — TRANSCRIBE ORDERS (OUTPATIENT)
Dept: ADMINISTRATIVE | Facility: HOSPITAL | Age: 62
End: 2022-10-25

## 2022-10-25 DIAGNOSIS — Z12.31 SCREENING MAMMOGRAM FOR BREAST CANCER: Primary | ICD-10-CM

## 2022-11-11 ENCOUNTER — APPOINTMENT (OUTPATIENT)
Dept: WOMENS IMAGING | Facility: HOSPITAL | Age: 62
End: 2022-11-11

## 2022-11-11 PROCEDURE — 77063 BREAST TOMOSYNTHESIS BI: CPT | Performed by: RADIOLOGY

## 2022-11-11 PROCEDURE — 77067 SCR MAMMO BI INCL CAD: CPT | Performed by: RADIOLOGY

## 2023-02-20 ENCOUNTER — TELEPHONE (OUTPATIENT)
Dept: SURGERY | Facility: CLINIC | Age: 63
End: 2023-02-20
Payer: COMMERCIAL

## 2023-02-20 NOTE — TELEPHONE ENCOUNTER
Per [facility] patient calling requesting 2nd colonoscopy packet-she misplaced original packet-mailed patient new packet.

## 2023-03-23 ENCOUNTER — PREP FOR SURGERY (OUTPATIENT)
Dept: OTHER | Facility: HOSPITAL | Age: 63
End: 2023-03-23
Payer: COMMERCIAL

## 2023-03-23 DIAGNOSIS — Z86.010 PERSONAL HISTORY OF COLONIC POLYPS: Primary | ICD-10-CM

## 2023-05-24 RX ORDER — BUPROPION HYDROCHLORIDE 100 MG/1
1 TABLET, EXTENDED RELEASE ORAL EVERY MORNING
COMMUNITY
Start: 2023-01-30

## 2023-05-25 ENCOUNTER — HOSPITAL ENCOUNTER (OUTPATIENT)
Facility: HOSPITAL | Age: 63
Setting detail: HOSPITAL OUTPATIENT SURGERY
Discharge: HOME | End: 2023-05-25
Attending: COLON & RECTAL SURGERY | Admitting: COLON & RECTAL SURGERY
Payer: COMMERCIAL

## 2023-05-25 ENCOUNTER — ANESTHESIA (OUTPATIENT)
Dept: GASTROENTEROLOGY | Facility: HOSPITAL | Age: 63
End: 2023-05-25
Payer: COMMERCIAL

## 2023-05-25 ENCOUNTER — ANESTHESIA EVENT (OUTPATIENT)
Dept: GASTROENTEROLOGY | Facility: HOSPITAL | Age: 63
End: 2023-05-25
Payer: COMMERCIAL

## 2023-05-25 VITALS
OXYGEN SATURATION: 100 % | HEART RATE: 67 BPM | WEIGHT: 249 LBS | BODY MASS INDEX: 42.51 KG/M2 | DIASTOLIC BLOOD PRESSURE: 85 MMHG | HEIGHT: 64 IN | SYSTOLIC BLOOD PRESSURE: 123 MMHG | RESPIRATION RATE: 16 BRPM

## 2023-05-25 DIAGNOSIS — Z86.010 PERSONAL HISTORY OF COLONIC POLYPS: ICD-10-CM

## 2023-05-25 PROCEDURE — 25010000002 PROPOFOL 10 MG/ML EMULSION: Performed by: ANESTHESIOLOGY

## 2023-05-25 PROCEDURE — 88305 TISSUE EXAM BY PATHOLOGIST: CPT | Performed by: COLON & RECTAL SURGERY

## 2023-05-25 PROCEDURE — 45380 COLONOSCOPY AND BIOPSY: CPT | Performed by: COLON & RECTAL SURGERY

## 2023-05-25 RX ORDER — LIDOCAINE HYDROCHLORIDE 20 MG/ML
INJECTION, SOLUTION INFILTRATION; PERINEURAL AS NEEDED
Status: DISCONTINUED | OUTPATIENT
Start: 2023-05-25 | End: 2023-05-25 | Stop reason: SURG

## 2023-05-25 RX ORDER — SODIUM CHLORIDE 0.9 % (FLUSH) 0.9 %
10 SYRINGE (ML) INJECTION AS NEEDED
Status: DISCONTINUED | OUTPATIENT
Start: 2023-05-25 | End: 2023-05-25 | Stop reason: HOSPADM

## 2023-05-25 RX ORDER — PROPOFOL 10 MG/ML
VIAL (ML) INTRAVENOUS AS NEEDED
Status: DISCONTINUED | OUTPATIENT
Start: 2023-05-25 | End: 2023-05-25 | Stop reason: SURG

## 2023-05-25 RX ORDER — SODIUM CHLORIDE, SODIUM LACTATE, POTASSIUM CHLORIDE, CALCIUM CHLORIDE 600; 310; 30; 20 MG/100ML; MG/100ML; MG/100ML; MG/100ML
30 INJECTION, SOLUTION INTRAVENOUS CONTINUOUS PRN
Status: DISCONTINUED | OUTPATIENT
Start: 2023-05-25 | End: 2023-05-25 | Stop reason: HOSPADM

## 2023-05-25 RX ORDER — SODIUM CHLORIDE 0.9 % (FLUSH) 0.9 %
10 SYRINGE (ML) INJECTION EVERY 12 HOURS SCHEDULED
Status: DISCONTINUED | OUTPATIENT
Start: 2023-05-25 | End: 2023-05-25 | Stop reason: HOSPADM

## 2023-05-25 RX ADMIN — PROPOFOL 200 MCG/KG/MIN: 10 INJECTION, EMULSION INTRAVENOUS at 12:01

## 2023-05-25 RX ADMIN — PROPOFOL 200 MG: 10 INJECTION, EMULSION INTRAVENOUS at 12:01

## 2023-05-25 RX ADMIN — SODIUM CHLORIDE, POTASSIUM CHLORIDE, SODIUM LACTATE AND CALCIUM CHLORIDE 30 ML/HR: 600; 310; 30; 20 INJECTION, SOLUTION INTRAVENOUS at 11:37

## 2023-05-25 RX ADMIN — LIDOCAINE HYDROCHLORIDE 60 MG: 20 INJECTION, SOLUTION INFILTRATION; PERINEURAL at 12:01

## 2023-05-25 NOTE — DISCHARGE INSTRUCTIONS
For the next 24 hours patient needs to be with a responsible adult.    For 24 hours DO NOT drive, operate machinery, appliances, drink alcohol, make important decisions or sign legal documents.    Start with a light or bland diet if you are feeling sick to your stomach otherwise advance to regular diet as tolerated.    Follow recommendations on procedure report if provided by your doctor.    Call Dr Cohen for problems [phone]    Problems may include but not limited to: large amounts of bleeding, trouble breathing, repeated vomiting, severe unrelieved pain, fever or chills.

## 2023-05-25 NOTE — ANESTHESIA POSTPROCEDURE EVALUATION
Patient: Lena Kirkpatrick    Procedure Summary     Date: 05/25/23 Room / Location: [facility] ENDOSCOPY 6 /  [facility]    Anesthesia Start: 1159 Anesthesia Stop: 1221    Procedure: COLONOSCOPY to cecum with cold polypectomy Diagnosis:       Personal history of colonic polyps      (Personal history of colonic polyps [Z86.010])    Surgeons: Rosi Cohen MD Provider: Ger Nicole MD    Anesthesia Type: MAC ASA Status: 3          Anesthesia Type: MAC    Vitals  Vitals Value Taken Time   /83 05/25/23 1230   Temp     Pulse 67 05/25/23 1239   Resp 16 05/25/23 1239   SpO2 100 % 05/25/23 1239           Post Anesthesia Care and Evaluation    Patient location during evaluation: PHASE II  Patient participation: complete - patient participated  Level of consciousness: awake and alert  Pain management: adequate    Airway patency: patent  Anesthetic complications: No anesthetic complications  PONV Status: none  Cardiovascular status: acceptable and hemodynamically stable  Respiratory status: acceptable, nonlabored ventilation and spontaneous ventilation  Hydration status: acceptable

## 2023-05-25 NOTE — ANESTHESIA PREPROCEDURE EVALUATION
Anesthesia Evaluation     Patient summary reviewed and Nursing notes reviewed   NPO Solid Status: > 8 hours  NPO Liquid Status: > 4 hours           Airway   Mallampati: III  TM distance: >3 FB  Neck ROM: full  Possible difficult intubation  Dental - normal exam     Pulmonary - normal exam   (+) a smoker Current Abstained day of surgery, COPD,   Cardiovascular - normal exam    (+) hypertension less than 2 medications,       Neuro/Psych  (+) headaches, numbness, psychiatric history Anxiety and Depression,    GI/Hepatic/Renal/Endo    (+) obesity, morbid obesity,  liver disease fatty liver disease, diabetes mellitus type 2,     Musculoskeletal     (+) back pain,   Abdominal   (+) obese,    Substance History      OB/GYN          Other                        Anesthesia Plan    ASA 3     MAC     intravenous induction     Anesthetic plan, risks, benefits, and alternatives have been provided, discussed and informed consent has been obtained with: patient.        CODE STATUS:

## 2023-05-25 NOTE — H&P
"Lena Kirkpatrick is a 62 y.o. female  who is referred by Rosi Cohen MD for a colonoscopy. She   has an indications: previous adenomatous polyp.     She denies any change in bowel function, melena, or hematochezia.    Past Medical History:   Diagnosis Date   • Abdominal pain    • Anxiety    • Back pain    • COPD (chronic obstructive pulmonary disease)    • CTS (carpal tunnel syndrome)     LEFT   • Depression    • Diabetes mellitus     \"PRE\"   • Fatigue     excessive   • Heat intolerance    • Hemorrhoids    • History of colon polyps    • Hypertension    • Numbness    • Ocular migraine    • Palpitation    • Poor vision    • Sexually transmitted disease     venereal disease   • Weight gain        Past Surgical History:   Procedure Laterality Date   • COLONOSCOPY N/A 10/19/2017    Procedure: COLONOSCOPY TO CECUM WITH POLYPECTOMY ( HOT SNARE;  Surgeon: Rosi Cohen MD;  Location: [facility] ENDOSCOPY;  Service:    • COLONOSCOPY  1997   • SUBTOTAL HYSTERECTOMY  02/2013       Medications Prior to Admission   Medication Sig Dispense Refill Last Dose   • buPROPion SR (WELLBUTRIN SR) 100 MG 12 hr tablet Take 1 tablet by mouth Every Morning.   5/25/2023   • metoprolol succinate XL (TOPROL-XL) 25 MG 24 hr tablet Take 1 tablet by mouth Daily. 90 tablet 3 5/25/2023   • sertraline (ZOLOFT) 50 MG tablet Take 1 tablet by mouth Daily. 90 tablet 3 5/25/2023   • albuterol sulfate  (90 Base) MCG/ACT inhaler Inhale 2 puffs Every 4 (Four) Hours As Needed for Wheezing or Shortness of Air. 1 inhaler 11 More than a month       Allergies   Allergen Reactions   • Lisinopril Cough       Family History   Problem Relation Age of Onset   • Hypertension Mother    • Thyroid disease Mother    • Depression Mother    • Anxiety disorder Mother    • Diverticulitis Mother    • Kidney failure Mother    • Heart disease Father    • Hypertension Father    • Diabetes Father    • Kidney disease Father    • Leukemia Sister    • Heart disease Brother    • " Hypertension Brother    • Heart disease Brother    • Heart disease Maternal Grandmother    • Other Maternal Grandfather         coal mining accident   • Diabetes Paternal Grandmother    • Heart disease Paternal Grandmother    • Leukemia Niece    • Malig Hyperthermia Neg Hx        Social History     Socioeconomic History   • Marital status:    • Number of children: 1   • Years of education: High school   Tobacco Use   • Smoking status: Every Day     Packs/day: 1.00     Years: 45.00     Pack years: 45.00     Types: Cigarettes   • Smokeless tobacco: Never   Vaping Use   • Vaping Use: Never used   Substance and Sexual Activity   • Alcohol use: Yes     Comment: Occasional   • Drug use: Never   • Sexual activity: Defer       Review of Systems   Gastrointestinal: Negative for abdominal pain, nausea and vomiting.   All other systems reviewed and are negative.      Vitals:    05/25/23 1131   BP: (!) 137/103   Pulse: 72   Resp: 17   SpO2: 95%         Physical Exam  Constitutional:       Appearance: She is well-developed.   HENT:      Head: Normocephalic and atraumatic.   Eyes:      Pupils: Pupils are equal, round, and reactive to light.   Cardiovascular:      Rate and Rhythm: Regular rhythm.   Pulmonary:      Effort: Pulmonary effort is normal.   Abdominal:      General: There is no distension.      Palpations: Abdomen is soft.   Musculoskeletal:         General: Normal range of motion.   Skin:     General: Skin is warm and dry.   Neurological:      Mental Status: She is alert and oriented to person, place, and time.   Psychiatric:         Thought Content: Thought content normal.         Judgment: Judgment normal.           Assessment & Plan      indications: previous adenomatous polyp         I recommend colonoscopy.  I described risks, benefits of the procedure with the patient including but not limited to bleeding, infection, possibility of perforation and possible polypectomy. All of the patient's questions were  answered and they would like to proceed with the above recommendations.

## 2023-05-26 LAB
LAB AP CASE REPORT: NORMAL
PATH REPORT.FINAL DX SPEC: NORMAL
PATH REPORT.GROSS SPEC: NORMAL

## (undated) DEVICE — CANN NASL CO2 TRULINK W/O2 A/

## (undated) DEVICE — TBG 02 CRUSH RESIST LF CLR 7FT

## (undated) DEVICE — TUBING, SUCTION, 1/4" X 10', STRAIGHT: Brand: MEDLINE

## (undated) DEVICE — SENSR O2 OXIMAX FNGR A/ 18IN NONSTR

## (undated) DEVICE — SNAR POLYP SENSATION STDOVL 27 240 BX40

## (undated) DEVICE — THE SINGLE USE ETRAP – POLYP TRAP IS USED FOR SUCTION RETRIEVAL OF ENDOSCOPICALLY REMOVED POLYPS.: Brand: ETRAP

## (undated) DEVICE — SINGLE-USE BIOPSY FORCEPS: Brand: RADIAL JAW 4

## (undated) DEVICE — THE TORRENT IRRIGATION SCOPE CONNECTOR IS USED WITH THE TORRENT IRRIGATION TUBING TO PROVIDE IRRIGATION FLUIDS SUCH AS STERILE WATER DURING GASTROINTESTINAL ENDOSCOPIC PROCEDURES WHEN USED IN CONJUNCTION WITH AN IRRIGATION PUMP (OR ELECTROSURGICAL UNIT).: Brand: TORRENT

## (undated) DEVICE — CANN O2 ETCO2 FITS ALL CONN CO2 SMPL A/ 7IN DISP LF

## (undated) DEVICE — KT ORCA ORCAPOD DISP STRL

## (undated) DEVICE — Device: Brand: DEFENDO AIR/WATER/SUCTION AND BIOPSY VALVE

## (undated) DEVICE — LN SMPL CO2 SHTRM SD STREAM W/M LUER

## (undated) DEVICE — ADAPT CLN BIOGUARD AIR/H2O DISP